# Patient Record
Sex: MALE | Race: WHITE | NOT HISPANIC OR LATINO | Employment: FULL TIME | URBAN - METROPOLITAN AREA
[De-identification: names, ages, dates, MRNs, and addresses within clinical notes are randomized per-mention and may not be internally consistent; named-entity substitution may affect disease eponyms.]

---

## 2017-08-14 ENCOUNTER — HOSPITAL ENCOUNTER (EMERGENCY)
Facility: HOSPITAL | Age: 27
Discharge: HOME | End: 2017-08-14
Payer: OTHER MISCELLANEOUS

## 2017-08-14 VITALS
DIASTOLIC BLOOD PRESSURE: 75 MMHG | HEART RATE: 71 BPM | SYSTOLIC BLOOD PRESSURE: 121 MMHG | RESPIRATION RATE: 18 BRPM | TEMPERATURE: 99.2 F | OXYGEN SATURATION: 96 % | WEIGHT: 190 LBS

## 2017-08-14 DIAGNOSIS — Z20.5 EXPOSURE TO HEPATITIS C: Primary | ICD-10-CM

## 2017-08-14 LAB — ALT SERPL W P-5'-P-CCNC: 47 U/L (ref 12–78)

## 2017-08-14 PROCEDURE — 87340 HEPATITIS B SURFACE AG IA: CPT | Performed by: PHYSICIAN ASSISTANT

## 2017-08-14 PROCEDURE — 99283 EMERGENCY DEPT VISIT LOW MDM: CPT

## 2017-08-14 PROCEDURE — 86706 HEP B SURFACE ANTIBODY: CPT | Performed by: PHYSICIAN ASSISTANT

## 2017-08-14 PROCEDURE — 87389 HIV-1 AG W/HIV-1&-2 AB AG IA: CPT | Performed by: PHYSICIAN ASSISTANT

## 2017-08-14 PROCEDURE — 36415 COLL VENOUS BLD VENIPUNCTURE: CPT | Performed by: PHYSICIAN ASSISTANT

## 2017-08-14 PROCEDURE — 84460 ALANINE AMINO (ALT) (SGPT): CPT | Performed by: PHYSICIAN ASSISTANT

## 2017-08-14 PROCEDURE — 86803 HEPATITIS C AB TEST: CPT | Performed by: PHYSICIAN ASSISTANT

## 2017-08-15 LAB
HBV SURFACE AB SER-ACNC: >1000 MIU/ML
HBV SURFACE AG SER QL: NORMAL
HCV AB SER QL: NORMAL

## 2017-08-16 LAB — HIV 1+2 AB+HIV1 P24 AG SERPL QL IA: NORMAL

## 2017-08-28 ENCOUNTER — APPOINTMENT (OUTPATIENT)
Dept: URGENT CARE | Facility: MEDICAL CENTER | Age: 27
End: 2017-08-28
Payer: OTHER MISCELLANEOUS

## 2017-08-28 PROCEDURE — G0382 LEV 3 HOSP TYPE B ED VISIT: HCPCS

## 2017-08-28 PROCEDURE — 99283 EMERGENCY DEPT VISIT LOW MDM: CPT

## 2017-09-25 ENCOUNTER — TRANSCRIBE ORDERS (OUTPATIENT)
Dept: RADIOLOGY | Facility: MEDICAL CENTER | Age: 27
End: 2017-09-25

## 2017-09-25 ENCOUNTER — APPOINTMENT (OUTPATIENT)
Dept: LAB | Facility: MEDICAL CENTER | Age: 27
End: 2017-09-25
Payer: OTHER MISCELLANEOUS

## 2017-09-25 DIAGNOSIS — Z77.21 PERSONAL HISTORY OF EXPOSURE TO POTENTIALLY HAZARDOUS BODY FLUIDS: Primary | ICD-10-CM

## 2017-09-25 LAB — ALT SERPL W P-5'-P-CCNC: 34 U/L (ref 12–78)

## 2017-09-25 PROCEDURE — 36415 COLL VENOUS BLD VENIPUNCTURE: CPT | Performed by: PHYSICIAN ASSISTANT

## 2017-09-25 PROCEDURE — 84460 ALANINE AMINO (ALT) (SGPT): CPT | Performed by: PHYSICIAN ASSISTANT

## 2017-09-25 PROCEDURE — 87389 HIV-1 AG W/HIV-1&-2 AB AG IA: CPT | Performed by: PHYSICIAN ASSISTANT

## 2017-09-25 PROCEDURE — 87350 HEPATITIS BE AG IA: CPT

## 2017-09-25 PROCEDURE — 86803 HEPATITIS C AB TEST: CPT | Performed by: PHYSICIAN ASSISTANT

## 2017-09-26 LAB
HBV E AG SERPL QL IA: NEGATIVE
HCV AB SER QL: NORMAL

## 2017-09-27 ENCOUNTER — APPOINTMENT (OUTPATIENT)
Dept: LAB | Facility: MEDICAL CENTER | Age: 27
End: 2017-09-27
Payer: OTHER MISCELLANEOUS

## 2017-09-27 ENCOUNTER — APPOINTMENT (OUTPATIENT)
Dept: URGENT CARE | Facility: MEDICAL CENTER | Age: 27
End: 2017-09-27
Payer: OTHER MISCELLANEOUS

## 2017-09-27 ENCOUNTER — TRANSCRIBE ORDERS (OUTPATIENT)
Dept: URGENT CARE | Facility: MEDICAL CENTER | Age: 27
End: 2017-09-27

## 2017-09-27 DIAGNOSIS — Z77.21 HX OF EXPOSURE TO HAZARDOUS BODILY FLUIDS: ICD-10-CM

## 2017-09-27 DIAGNOSIS — Z77.21 HX OF EXPOSURE TO HAZARDOUS BODILY FLUIDS: Primary | ICD-10-CM

## 2017-09-27 LAB — HIV 1+2 AB+HIV1 P24 AG SERPL QL IA: NORMAL

## 2017-09-27 PROCEDURE — 36415 COLL VENOUS BLD VENIPUNCTURE: CPT

## 2017-09-27 PROCEDURE — 87522 HEPATITIS C REVRS TRNSCRPJ: CPT

## 2017-09-29 LAB
HCV RNA SERPL NAA+PROBE-ACNC: NORMAL IU/ML
TEST INFORMATION: NORMAL

## 2017-12-06 ENCOUNTER — OFFICE VISIT (OUTPATIENT)
Dept: URGENT CARE | Facility: MEDICAL CENTER | Age: 27
End: 2017-12-06
Payer: COMMERCIAL

## 2017-12-06 ENCOUNTER — APPOINTMENT (OUTPATIENT)
Dept: RADIOLOGY | Facility: MEDICAL CENTER | Age: 27
End: 2017-12-06
Payer: COMMERCIAL

## 2017-12-06 DIAGNOSIS — S61.512A: ICD-10-CM

## 2017-12-06 PROCEDURE — G0382 LEV 3 HOSP TYPE B ED VISIT: HCPCS

## 2017-12-06 PROCEDURE — S9083 URGENT CARE CENTER GLOBAL: HCPCS

## 2017-12-06 PROCEDURE — 12001 RPR S/N/AX/GEN/TRNK 2.5CM/<: CPT

## 2017-12-06 PROCEDURE — 73110 X-RAY EXAM OF WRIST: CPT

## 2017-12-06 PROCEDURE — 90715 TDAP VACCINE 7 YRS/> IM: CPT

## 2017-12-07 NOTE — PROGRESS NOTES
Assessment    1  Laceration of left wrist (881 02) (J65 171F)    Plan  Laceration of left wrist    · Cephalexin 500 MG Oral Capsule; Take 1 capsule twice daily   · * XR WRIST 3+ VIEW LEFT; Status:Resulted - Requires Verification;   Done: [id number]:21PM  Wrist injury    · Tdap (Boostrix)    Discussion/Summary  Discussion Summary:   Keep wound clean dry and intactfor signs of infection: redness, warmth to touch, fever/chills, nausea, vomitingantibiotics as directed eat yogurt to avoid GI upsetgetting wound wet x 3 dayssutures in 7-10 dayscheck in 24 hours   Medication Side Effects Reviewed: Possible side effects of new medications were reviewed with the patient/guardian today  Understands and agrees with treatment plan: The treatment plan was reviewed with the patient/guardian  The patient/guardian understands and agrees with the treatment plan   Counseling Documentation With Imm: The patient was counseled regarding diagnostic results,-- instructions for management,-- risk factor reductions,-- prognosis,-- patient and family education,-- impressions,-- risks and benefits of treatment options,-- importance of compliance with treatment  Follow Up Instructions: Follow Up with your Primary Care Provider in 1-2 days  If your symptoms worsen, go to the nearest [place] [address] Emergency Department  Chief Complaint    1  Skin Wound  Chief Complaint Free Text Note Form: hit left wrist posterior aspect with remy   strong pulse noted   stated that hand feels numb  History of Present Illness  HPI: This is a 24-year-old male here for laceration to left wrist  patient reports he was cutting firewood for his wood stove and dog bumped his leg  Patient reports some numbness that radiates down his hand  Hospital Based Practices Required Assessment:  Pain Assessment  the patient states they have pain  The pain is located in the with movement   (on a scale of 0 to 10, the patient rates the pain at 5 )  Abuse And Domestic Violence Screen   Yes, the patient is safe at home  -- The patient states no one is hurting them  Depression And Suicide Screen  No, the patient has not had thoughts of hurting themself  No, the patient has not felt depressed in the past 7 days  Primary Language is  English  Readiness To Learn: Receptive  Barriers To Learning: none  Preferred Learning: verbal  Education Completed: disease/condition-- and-- treatment/procedure  Teaching Method: verbal  Person Taught: patient  Evaluation Of Learning: verbalized/demonstrated understanding      Review of Systems  Focused-Male:  Constitutional: no fever or chills, feels well, no tiredness, no recent weight loss or weight gain  ENT: no complaints of earache, no loss of hearing, no nosebleeds or nasal discharge, no sore throat or hoarseness  Cardiovascular: no complaints of slow or fast heart rate, no chest pain, no palpitations, no leg claudication or lower extremity edema  Respiratory: no complaints of shortness of breath, no wheezing or cough, no dyspnea on exertion, no orthopnea or PND  Gastrointestinal: no complaints of abdominal pain, no constipation, no nausea or vomiting, no diarrhea or bloody stools  Genitourinary: no complaints of dysuria or incontinence, no hesitancy, no nocturia, no genital lesion, no inadequacy of penile erection  Musculoskeletal: no complaints of arthralgia, no myalgia, no joint swelling or stiffness, no limb pain or swelling  Integumentary: no complaints of skin rash or lesion, no itching or dry skin, no skin wounds-- and-- as noted in HPI  Neurological: no complaints of headache, no confusion, no numbness or tingling, no dizziness or fainting  Active Problems  1  Infrapatellar or subpatellar bursitis (726 69) (M70 50)   2  Well adult on routine health check (V70 0) (Z00 00)    Past Medical History  1  History of viral warts (V12 09) (Z86 19)  Active Problems And Past Medical History Reviewed:    The active problems and past medical history were reviewed and updated today  Family History  Family History Reviewed: The family history was reviewed and updated today  Social History   · Being A Social Drinker   · Denied: History of Caffeine Use   · Never A Smoker  Social History Reviewed: The social history was reviewed and updated today  Surgical History  Surgical History Reviewed: The surgical history was reviewed and updated today  Current Meds   1  No Reported Medications Recorded  Medication List Reviewed: The medication list was reviewed and updated today  Allergies    1  No Known Drug Allergies    Vitals  Signs   Recorded: [id number] 01:58PM   Temperature: 98 4 F  Heart Rate: 55  Respiration: 18  Systolic: 044  Diastolic: 74  Height: 5 ft 10 in  Weight: 202 lb   BMI Calculated: 28 98  BSA Calculated: 2 1  O2 Saturation: 95    Physical Exam   Constitutional  General appearance: No acute distress, well appearing and well nourished  Skin  Skin and subcutaneous tissue: Normal without rashes or lesions  Examination of the skin for lesions: Abnormal  -- Left wrist: laceration dorsal radial aspect measuring 2cm  Results/Data  * XR WRIST 3+ VIEW LEFT [id number] 02:21PM Corrinne Soman Order Number: [id number]     Test Name Result Flag Reference   XR WRIST 3+ VW LEFT (Report)       LEFT WRIST   INDICATION: Wrist laceration  Evaluation for foreign body  COMPARISON: None   VIEWS: PA (both neutral and ulnar deviation), lateral, and oblique   IMAGES: 4   FINDINGS:   There is no acute fracture or dislocation  No degenerative changes  No lytic or blastic lesions are seen  Soft tissues are unremarkable  No radiopaque foreign body  IMPRESSION:   No acute osseous abnormality or radiopaque foreign body demonstrated      Workstation performed: [id number]   Signed by:  Julio Darby MD  12/6/17       Diagnostic Studies Reviewed: I personally reviewed the films/images/results in the office today  My interpretation follows  X-ray Review Left wrist: No acute osseous abnormality  Procedure   Procedure: wound repair  The wound was located on the dorsal radial pulse,-- and was 2 cm in length  The wound was simple  The wound involved the subcutaneous tissue  The wound was linear-- and-- was contaminated, but-- did not have a foreign body-- and-- did not have tissue loss  the neurovascular exam was normal  there was no tendon injury  The site was prepped with Betadine and Hibiclens  Anesthesia: Local anesthetic was administered  Lidocaine 3 ml, 1%, without epinephrine was injected  Closure: The cutaneous layer was closed with 4 sutures of  simple interrupted sutures were used for the skin closure  Dressing: a sterile dressing was placed-- and-- an antibiotic ointment was applied  Patient Status:  the patient tolerated the procedure well  Message  Return to work or school:   Morgan Stubbs is under my professional care  He was seen in my office on 12/06/2017       Please excuse illness  Homero Kay PA-C        Signatures   Electronically signed by : Bethel Saini, [facility]; Dec  6 2017  2:52PM EST                       (Author)    Electronically signed by : EMMA Goncalves ; Dec  6 2017  3:22PM EST                       (Co-author)

## 2018-01-23 VITALS
HEIGHT: 70 IN | HEART RATE: 55 BPM | TEMPERATURE: 98.4 F | SYSTOLIC BLOOD PRESSURE: 131 MMHG | BODY MASS INDEX: 28.92 KG/M2 | WEIGHT: 202 LBS | RESPIRATION RATE: 18 BRPM | DIASTOLIC BLOOD PRESSURE: 74 MMHG | OXYGEN SATURATION: 95 %

## 2018-06-20 ENCOUNTER — TRANSCRIBE ORDERS (OUTPATIENT)
Dept: ADMINISTRATIVE | Facility: HOSPITAL | Age: 28
End: 2018-06-20

## 2018-06-20 DIAGNOSIS — G47.33 OBSTRUCTIVE SLEEP APNEA (ADULT) (PEDIATRIC): Primary | ICD-10-CM

## 2018-06-29 ENCOUNTER — HOSPITAL ENCOUNTER (OUTPATIENT)
Dept: SLEEP CENTER | Facility: CLINIC | Age: 28
Discharge: HOME | End: 2018-06-29
Payer: COMMERCIAL

## 2018-06-29 DIAGNOSIS — G47.33 OBSTRUCTIVE SLEEP APNEA (ADULT) (PEDIATRIC): ICD-10-CM

## 2018-06-29 PROCEDURE — 95810 POLYSOM 6/> YRS 4/> PARAM: CPT

## 2018-06-29 NOTE — PROGRESS NOTES
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 7AM: Alcohol use:NO    Typical day for patient:YES       Study Documentation  Diagnostic   Snore: Moderate  Supplemental O2: no    O2 flow rate (L/min) range n/a  Minimum SaO2 86%  Baseline SaO2 94%        EKG abnormalities: no     EEG abnormalities: no    Study Terminated:no    Patient classification: employed       Post-Sleep Study    Medication used at bedtime or during sleep study:NO    Patient reports time it took to fall asleep: approx 30 minutes or a bit more    Patient reports waking up during study:3 or more times  Patient reports returning to sleep in 30 minutes or less  Patient reports sleeping 4 hours without dreaming      Patient reports sleep during study:better than usual    Patient rated sleepiness: Somewhat sleepy or tired

## 2018-07-03 ENCOUNTER — TELEPHONE (OUTPATIENT)
Dept: SLEEP CENTER | Facility: CLINIC | Age: 28
End: 2018-07-03

## 2019-12-31 ENCOUNTER — OFFICE VISIT (OUTPATIENT)
Dept: URGENT CARE | Facility: MEDICAL CENTER | Age: 29
End: 2019-12-31
Payer: COMMERCIAL

## 2019-12-31 VITALS
OXYGEN SATURATION: 98 % | WEIGHT: 199 LBS | HEIGHT: 70 IN | SYSTOLIC BLOOD PRESSURE: 112 MMHG | HEART RATE: 68 BPM | DIASTOLIC BLOOD PRESSURE: 70 MMHG | RESPIRATION RATE: 18 BRPM | BODY MASS INDEX: 28.49 KG/M2 | TEMPERATURE: 97.9 F

## 2019-12-31 DIAGNOSIS — R05.9 COUGH: Primary | ICD-10-CM

## 2019-12-31 PROCEDURE — 99213 OFFICE O/P EST LOW 20 MIN: CPT | Performed by: PHYSICIAN ASSISTANT

## 2019-12-31 RX ORDER — BENZONATATE 100 MG/1
100 CAPSULE ORAL 3 TIMES DAILY PRN
Qty: 20 CAPSULE | Refills: 0 | Status: SHIPPED | OUTPATIENT
Start: 2019-12-31 | End: 2021-03-09 | Stop reason: ALTCHOICE

## 2019-12-31 NOTE — PROGRESS NOTES
[facility]        NAME: Elver Erickson is a 30 y o  male  : 1990    MRN: [id number]  DATE: 2019  TIME: 2:29 PM    Assessment and Plan   Cough [R05]  1  Cough  benzonatate (TESSALON PERLES) 100 mg capsule     Likely allergy related, normal exam     Patient Instructions     Use Flonase and Zyrtec daily   may use Tessalon Perles as needed for cough   follow-up with PCP if symptoms do not improve    Chief Complaint     Chief Complaint   Patient presents with    Cough     Cough and post nasal drip x 4 weeks  History of Present Illness         Patient is a 78-year-old male who presents today with complaints of cough and postnasal drip for the past 4 weeks  He has only had mild intermittent congestion  No ear pain, sore throat, fevers, body aches  Has used Mucinex, Zyrtec with little relief of symptoms  No shortness of breath or wheezing  Review of Systems   Review of Systems   Constitutional: Negative for chills and fever  HENT: Positive for congestion and postnasal drip  Negative for ear pain and sore throat  Respiratory: Positive for cough  Negative for shortness of breath and wheezing  Cardiovascular: Negative for chest pain  Musculoskeletal: Negative for myalgias  Current Medications       Current Outpatient Medications:     benzonatate (TESSALON PERLES) 100 mg capsule, Take 1 capsule (100 mg total) by mouth 3 (three) times a day as needed for cough, Disp: 20 capsule, Rfl: 0    Current Allergies     Allergies as of 2019    (No Known Allergies)            The following portions of the patient's history were reviewed and updated as appropriate: allergies, current medications, past family history, past medical history, past social history, past surgical history and problem list      History reviewed  No pertinent past medical history  History reviewed  No pertinent surgical history      Family History   Problem Relation Age of Onset    Ulcerative colitis Mother     Cancer Father          Medications have been verified  Objective   /70   Pulse 68   Temp 97 9 °F (36 6 °C) (Temporal)   Resp 18   Ht 5' 10" (1 778 m)   Wt 90 3 kg (199 lb)   SpO2 98%   BMI 28 55 kg/m²        Physical Exam     Physical Exam   Constitutional: He appears well-developed and well-nourished  HENT:   Head: Normocephalic and atraumatic  Right Ear: Tympanic membrane and ear canal normal    Left Ear: Tympanic membrane and ear canal normal    Nose: Nose normal    Mouth/Throat: Uvula is midline, oropharynx is clear and moist and mucous membranes are normal    Eyes: Pupils are equal, round, and reactive to light  Conjunctivae are normal    Neck: Neck supple  Cardiovascular: Normal rate and regular rhythm  Pulmonary/Chest: Effort normal and breath sounds normal    Lymphadenopathy:     He has no cervical adenopathy  Skin: Skin is warm and dry

## 2021-02-18 DIAGNOSIS — Z23 ENCOUNTER FOR IMMUNIZATION: ICD-10-CM

## 2021-02-19 ENCOUNTER — IMMUNIZATIONS (OUTPATIENT)
Dept: FAMILY MEDICINE CLINIC | Facility: HOSPITAL | Age: 31
End: 2021-02-19

## 2021-02-19 DIAGNOSIS — Z23 ENCOUNTER FOR IMMUNIZATION: Primary | ICD-10-CM

## 2021-02-19 PROCEDURE — 91300 SARS-COV-2 / COVID-19 MRNA VACCINE (PFIZER-BIONTECH) 30 MCG: CPT

## 2021-02-19 PROCEDURE — 0001A SARS-COV-2 / COVID-19 MRNA VACCINE (PFIZER-BIONTECH) 30 MCG: CPT

## 2021-03-09 ENCOUNTER — OFFICE VISIT (OUTPATIENT)
Dept: OBGYN CLINIC | Facility: CLINIC | Age: 31
End: 2021-03-09
Payer: COMMERCIAL

## 2021-03-09 VITALS
BODY MASS INDEX: 26.54 KG/M2 | SYSTOLIC BLOOD PRESSURE: 113 MMHG | DIASTOLIC BLOOD PRESSURE: 68 MMHG | HEART RATE: 63 BPM | WEIGHT: 185 LBS

## 2021-03-09 DIAGNOSIS — M23.91 INTERNAL DERANGEMENT OF RIGHT KNEE: Primary | ICD-10-CM

## 2021-03-09 DIAGNOSIS — M23.92 INTERNAL DERANGEMENT OF LEFT KNEE: ICD-10-CM

## 2021-03-09 DIAGNOSIS — M22.2X1 PATELLOFEMORAL DISORDER OF BOTH KNEES: ICD-10-CM

## 2021-03-09 DIAGNOSIS — M22.2X2 PATELLOFEMORAL DISORDER OF BOTH KNEES: ICD-10-CM

## 2021-03-09 PROCEDURE — 99203 OFFICE O/P NEW LOW 30 MIN: CPT | Performed by: ORTHOPAEDIC SURGERY

## 2021-03-09 NOTE — PROGRESS NOTES
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  1  MRI bilateral knees to evaluate persistent pain after activity modification and physical therapy  2  Activity as tolerated for now  Minimize painful activities  3  Follow up after MRIs are complete  Chief Complaint     Bilateral Knee Pain    History of the Present Illness     Faiza Mendoza is a 26 y o  male with bilateral knee pain for the past 4-5 years  No prior injury  Pain is exacerbated by squatting or increased activity level  Symptoms localize to the anterior aspect with associated swelling on the right  No mechanical symptoms or instability  He tried an extensive course of physical therapy with no improvement  He has tried anti-inflammatory medication with no relief  Denies numbness and tingling, fevers or chills  Physical Exam     /68   Pulse 63   Wt 83 9 kg (185 lb)   BMI 26 54 kg/m²     Bilateral knee:  Effusion:  None  Tenderness:  Patellar tendon  Range of motion:  Extension:  Normal  Flexion:  Normal  Lachman test:  Stable  Valgus stress:  Stable  Varus stress:  Stable  Posterior drawer test:  Stable  Robina's test:  Negative  Patellar grind: Positive  Patellar apprehension test:  Negative    Eyes:  Anicteric sclerae  ENT:  Trachea midline  Lungs:  Normal respiratory effort  Cardiovascular:  Capillary refill is less than 2 seconds  Lymphatic:  No palpable lymphadenopathy  Skin:  Intact without erythema  Neurologic:  Sensation grossly intact to light touch  Psychiatric:  Mood and affect are appropriate  No past medical history on file  No past surgical history on file      No Known Allergies    Current Outpatient Medications on File Prior to Visit   Medication Sig Dispense Refill    [DISCONTINUED] benzonatate (TESSALON PERLES) 100 mg capsule Take 1 capsule (100 mg total) by mouth 3 (three) times a day as needed for cough 20 capsule 0 No current facility-administered medications on file prior to visit  Social History     Tobacco Use    Smoking status: Never Smoker    Smokeless tobacco: Never Used   Substance Use Topics    Alcohol use: Yes     Comment: Social     Drug use: No       Family History   Problem Relation Age of Onset    Ulcerative colitis Mother     Cancer Father        Review of Systems     As stated in the HPI  All other systems were reviewed and are negative      Scribe Attestation    I,:  Keyona Guillermo am acting as a scribe while in the presence of the attending physician :       I,:  Jorge Gordon MD personally performed the services described in this documentation    as scribed in my presence :

## 2021-03-10 ENCOUNTER — IMMUNIZATIONS (OUTPATIENT)
Dept: FAMILY MEDICINE CLINIC | Facility: HOSPITAL | Age: 31
End: 2021-03-10

## 2021-03-10 DIAGNOSIS — Z23 ENCOUNTER FOR IMMUNIZATION: Primary | ICD-10-CM

## 2021-03-10 PROCEDURE — 91300 SARS-COV-2 / COVID-19 MRNA VACCINE (PFIZER-BIONTECH) 30 MCG: CPT

## 2021-03-10 PROCEDURE — 0002A SARS-COV-2 / COVID-19 MRNA VACCINE (PFIZER-BIONTECH) 30 MCG: CPT

## 2021-03-23 ENCOUNTER — HOSPITAL ENCOUNTER (OUTPATIENT)
Dept: MRI IMAGING | Facility: HOSPITAL | Age: 31
Discharge: HOME | End: 2021-03-23
Attending: ORTHOPAEDIC SURGERY
Payer: COMMERCIAL

## 2021-03-23 DIAGNOSIS — M23.91 INTERNAL DERANGEMENT OF RIGHT KNEE: ICD-10-CM

## 2021-03-23 DIAGNOSIS — M23.92 INTERNAL DERANGEMENT OF LEFT KNEE: ICD-10-CM

## 2021-03-23 PROCEDURE — G1004 CDSM NDSC: HCPCS

## 2021-03-23 PROCEDURE — 73721 MRI JNT OF LWR EXTRE W/O DYE: CPT

## 2021-03-29 ENCOUNTER — TELEPHONE (OUTPATIENT)
Dept: GASTROENTEROLOGY | Facility: CLINIC | Age: 31
End: 2021-03-29

## 2021-03-29 ENCOUNTER — DOCUMENTATION (OUTPATIENT)
Dept: GASTROENTEROLOGY | Facility: CLINIC | Age: 31
End: 2021-03-29

## 2021-03-29 NOTE — TELEPHONE ENCOUNTER
Rony Reynoso from [address] [place] [address] called to request most recent colonoscopy report for this patient    Please Fax to [phone]

## 2021-04-06 ENCOUNTER — OFFICE VISIT (OUTPATIENT)
Dept: OBGYN CLINIC | Facility: CLINIC | Age: 31
End: 2021-04-06
Payer: COMMERCIAL

## 2021-04-06 VITALS
HEART RATE: 56 BPM | BODY MASS INDEX: 27.2 KG/M2 | WEIGHT: 190 LBS | DIASTOLIC BLOOD PRESSURE: 74 MMHG | HEIGHT: 70 IN | SYSTOLIC BLOOD PRESSURE: 125 MMHG

## 2021-04-06 DIAGNOSIS — M76.52 PATELLAR TENDONITIS OF BOTH KNEES: Primary | ICD-10-CM

## 2021-04-06 DIAGNOSIS — M76.51 PATELLAR TENDONITIS OF BOTH KNEES: Primary | ICD-10-CM

## 2021-04-06 PROCEDURE — 3008F BODY MASS INDEX DOCD: CPT | Performed by: ORTHOPAEDIC SURGERY

## 2021-04-06 PROCEDURE — 99213 OFFICE O/P EST LOW 20 MIN: CPT | Performed by: ORTHOPAEDIC SURGERY

## 2021-04-06 NOTE — PROGRESS NOTES
Patient Name:  Crissie Habermann  MRN:  [id number]    Assessment & Plan     Bilateral knee Chronic patellar tendonitis, partial tearing right patellar tendon  1  Recommend continue conservative management at this time with activity modification  2  Discussed PRP injections as an alternative  Patient wishes to think this over  Also discussed surgical intervention  Patient wishes to avoid surgery at this time  3  Follow-up as needed    History of the Present Illness      52-year-old male returns to the office today for follow-up regarding his bilateral knees  He recently underwent MRIs and is here to review the results  He still notes pain in the bilateral knees localized to the anterior aspect  Pain is worse increased activity  He denies weakness or instability  Has tried physical therapy and meloxicam without significant relief  General ROS:  Negative for fever or chills  Neurological ROS:  Negative for numbness or tingling  Physical Exam     /74   Pulse 56   Ht 5' 10" (1 778 m)   Wt 86 2 kg (190 lb)   BMI 27 26 kg/m²       Counseling     The patient was counseled regarding diagnostic results, impressions, patient/family education, instructions for management, risks and benefits of treatment options, and prognosis  The total time of the encounter was 20 minutes, and more than 50% of that time was spent in counseling and coordination of care  Data Review     I have personally reviewed pertinent films in PACS, and my interpretation follows  MRI left knee 3/23/21: mild patellar tendinosis  No significant degenerative changes  MRI right knee 3/23/21: Mild degenerative changes medial and patellofemoral compartments  Partial tearing patellar tendon at the inferior pole of the patella with associated tendinosis      Social History     Tobacco Use    Smoking status: Never Smoker    Smokeless tobacco: Never Used   Substance Use Topics    Alcohol use: Yes     Comment: Social     Drug use: No         Scribe Attestation    I,:  Leela Mann PA-C am acting as a scribe while in the presence of the attending physician :       I,:  Mortimer Mathieu, MD personally performed the services described in this documentation    as scribed in my presence :

## 2021-06-09 ENCOUNTER — TELEPHONE (OUTPATIENT)
Dept: GASTROENTEROLOGY | Facility: CLINIC | Age: 31
End: 2021-06-09

## 2021-06-09 NOTE — LETTER
Jacquelyn 10, 2021     Beba Elder    Patient: Juanjo Kimball   YOB: 1990         To whomever this may concern:       Juanjo Kimball is under the care of this office  He required routine colonoscopy screening in 2018 and will require regular colonoscopy screenings in the future  If you have questions, please do not hesitate to call me  Sincerely,        LACIE Corona      CC: No Recipients  LACIE Corona  6/10/2021  9:14 AM  Signed  What is his email address?

## 2021-06-09 NOTE — TELEPHONE ENCOUNTER
Pt is in the [employer] and will need a letter stating that his colonoscopy was routine and it is recommended that he have routine colonoscopies in the future  Please email it to him

## 2021-06-10 ENCOUNTER — TELEPHONE (OUTPATIENT)
Dept: GASTROENTEROLOGY | Facility: CLINIC | Age: 31
End: 2021-06-10

## 2021-06-10 NOTE — TELEPHONE ENCOUNTER
Left message on patient's phone that we will put colonoscopy recall in for December 2023 given family history colon cancer/personal history colon polyps, if he develops any symptoms he can schedule follow up appointment to discuss and then we would consider colonoscopy sooner  ----- Message from Dandy Limon MD sent at 6/10/2021 12:30 PM EDT -----  Regarding: RE: Colonoscopy Recall    He could wait for two years more if he is completely asymptomatic   ----- Message -----  From: LACIE Corona  Sent: 6/10/2021  10:16 AM EDT  To: Dandy Limon MD  Subject: Colonoscopy Recall                               This is a 33year old male with family history of colon cancer in his father and grandfather diagnosed at age 48  We last saw him in 2018 for colonoscopy due to rectal bleeding which showed 2 hyperplastic polyps and internal hemorrhoids  He said you told him to return in 3-5 years, but there is no recall listed in [address] [place] [address] [place]  If the recall is 3 years he will be due this coming December  Please let me know and I can make sure a recall is placed for colonoscopy in December this year   Thank you

## 2021-06-10 NOTE — TELEPHONE ENCOUNTER
Thank you, note was sent  There is no recall listed for his next colonoscopy in [facility], so I reached out to Dr Ramirez Parks to see when next colonoscopy is due since he has a family history of colon cancer in his father/grandfather

## 2021-10-26 ENCOUNTER — OFFICE VISIT (OUTPATIENT)
Dept: GASTROENTEROLOGY | Facility: CLINIC | Age: 31
End: 2021-10-26
Payer: COMMERCIAL

## 2021-10-26 VITALS
SYSTOLIC BLOOD PRESSURE: 127 MMHG | DIASTOLIC BLOOD PRESSURE: 79 MMHG | HEIGHT: 70 IN | BODY MASS INDEX: 26.92 KG/M2 | WEIGHT: 188 LBS | HEART RATE: 55 BPM

## 2021-10-26 DIAGNOSIS — E80.4 GILBERT'S SYNDROME: ICD-10-CM

## 2021-10-26 DIAGNOSIS — Z80.0 FAMILY HISTORY OF COLON CANCER: ICD-10-CM

## 2021-10-26 DIAGNOSIS — K63.5 HYPERPLASTIC COLONIC POLYP, UNSPECIFIED PART OF COLON: ICD-10-CM

## 2021-10-26 DIAGNOSIS — R79.89 ABNORMAL LFTS: Primary | ICD-10-CM

## 2021-10-26 PROCEDURE — 99244 OFF/OP CNSLTJ NEW/EST MOD 40: CPT | Performed by: INTERNAL MEDICINE

## 2022-01-11 ENCOUNTER — TELEPHONE (OUTPATIENT)
Dept: OBGYN CLINIC | Facility: HOSPITAL | Age: 32
End: 2022-01-11

## 2022-02-22 ENCOUNTER — APPOINTMENT (OUTPATIENT)
Dept: RADIOLOGY | Facility: AMBULARY SURGERY CENTER | Age: 32
End: 2022-02-22
Payer: COMMERCIAL

## 2022-02-22 ENCOUNTER — OFFICE VISIT (OUTPATIENT)
Dept: OBGYN CLINIC | Facility: CLINIC | Age: 32
End: 2022-02-22
Payer: COMMERCIAL

## 2022-02-22 VITALS
BODY MASS INDEX: 27.29 KG/M2 | DIASTOLIC BLOOD PRESSURE: 76 MMHG | HEIGHT: 70 IN | WEIGHT: 190.6 LBS | SYSTOLIC BLOOD PRESSURE: 120 MMHG | HEART RATE: 63 BPM

## 2022-02-22 DIAGNOSIS — G89.29 CHRONIC RIGHT SHOULDER PAIN: Primary | ICD-10-CM

## 2022-02-22 DIAGNOSIS — M25.511 CHRONIC RIGHT SHOULDER PAIN: ICD-10-CM

## 2022-02-22 DIAGNOSIS — G89.29 CHRONIC RIGHT SHOULDER PAIN: ICD-10-CM

## 2022-02-22 DIAGNOSIS — M25.511 CHRONIC RIGHT SHOULDER PAIN: Primary | ICD-10-CM

## 2022-02-22 PROCEDURE — 99214 OFFICE O/P EST MOD 30 MIN: CPT | Performed by: PHYSICAL MEDICINE & REHABILITATION

## 2022-02-22 PROCEDURE — 73030 X-RAY EXAM OF SHOULDER: CPT

## 2022-02-22 RX ORDER — AMOXICILLIN AND CLAVULANATE POTASSIUM 875; 125 MG/1; MG/1
TABLET, FILM COATED ORAL
COMMUNITY
Start: 2021-12-05 | End: 2022-04-18

## 2022-02-22 RX ORDER — IBUPROFEN 200 MG
200 TABLET ORAL EVERY 6 HOURS PRN
COMMUNITY
End: 2022-04-18

## 2022-02-22 NOTE — PROGRESS NOTES
1  Chronic right shoulder pain  XR shoulder 2+ vw right     Orders Placed This Encounter   Procedures    XR shoulder 2+ vw right        Impression:  Right shoulder pain likely secondary to biceps tendinitis and subacromial impingement  We discussed different treatment options including a subacromial space steroid injection, physical therapy, anti-inflammatories and home exercise program   Patient's active range of motion is limited from this injury  He is able to abduct and flex his shoulder to about 70 ° at which point it becomes painful  He is able to get to nearly full range but with much pain  The patient has had this pain 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  It has been worsening over the past few months/years  If he is unable to obtain care from the [place], I would like to see him back  Imaging Studies (I personally reviewed images in PACS and report):  Right shoulder x-rays most recent to this encounter reviewed  These images show no acute osseous abnormalities or severe degeneration  No follow-ups on file  Patient is in agreement with the above plan  HPI:  Stas Johnston is a 33 y o  male  who presents for evaluation of   Chief Complaint   Patient presents with    Right Shoulder - Pain       Onset/Mechanism:  Pain started about 12 years ago 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  Location:  Anterior shoulder  Radiation:  Into the posterior shoulder  Provocative:  Reaching and lifting activities  Severity:  Worsening  Associated Symptoms:  Trouble sleeping on his right side and with certain activities of daily living  Treatment so far:  No recent treatment  Following history reviewed and updated:  History reviewed  No pertinent past medical history  History reviewed  No pertinent surgical history    Social History   Social History     Substance and Sexual Activity   Alcohol Use Yes    Comment: Social      Social History     Substance and Sexual Activity Drug Use No     Social History     Tobacco Use   Smoking Status Never Smoker   Smokeless Tobacco Never Used     Family History   Problem Relation Age of Onset    Ulcerative colitis Mother     Cancer Father      No Known Allergies     Constitutional:  /76   Pulse 63   Ht 5' 10" (1 778 m)   Wt 86 5 kg (190 lb 9 6 oz)   BMI 27 35 kg/m²    General: NAD  Eyes: Anicteric sclerae  Neck: Supple  Lungs: Unlabored breathing  Cardiovascular: No lower extremity edema  Skin: Intact without erythema  Neurologic: Sensation intact to light touch  Psychiatric: Mood and affect are appropriate  Right Shoulder Exam     Tenderness   The patient is experiencing tenderness in the acromion and biceps tendon      Range of Motion   Active abduction: abnormal   Passive abduction: normal   External rotation: abnormal   Forward flexion: abnormal   Internal rotation 0 degrees:  T12 abnormal     Tests   Apprehension: positive  Dan test: positive  Cross arm: negative  Impingement: positive  Drop arm: negative  Sulcus: absent    Other   Erythema: absent  Scars: absent  Sensation: normal  Pulse: present    Comments:  Positive speed's test              Procedures

## 2022-04-18 ENCOUNTER — OFFICE VISIT (OUTPATIENT)
Dept: FAMILY MEDICINE CLINIC | Facility: CLINIC | Age: 32
End: 2022-04-18
Payer: COMMERCIAL

## 2022-04-18 VITALS
DIASTOLIC BLOOD PRESSURE: 64 MMHG | WEIGHT: 190 LBS | HEIGHT: 70 IN | SYSTOLIC BLOOD PRESSURE: 106 MMHG | HEART RATE: 62 BPM | BODY MASS INDEX: 27.2 KG/M2 | TEMPERATURE: 97.9 F | OXYGEN SATURATION: 98 % | RESPIRATION RATE: 16 BRPM

## 2022-04-18 DIAGNOSIS — M22.2X1 PATELLOFEMORAL DISORDER OF BOTH KNEES: ICD-10-CM

## 2022-04-18 DIAGNOSIS — H93.13 BILATERAL TINNITUS: ICD-10-CM

## 2022-04-18 DIAGNOSIS — Z00.00 ANNUAL PHYSICAL EXAM: Primary | ICD-10-CM

## 2022-04-18 DIAGNOSIS — M22.2X2 PATELLOFEMORAL DISORDER OF BOTH KNEES: ICD-10-CM

## 2022-04-18 PROCEDURE — 3725F SCREEN DEPRESSION PERFORMED: CPT | Performed by: FAMILY MEDICINE

## 2022-04-18 PROCEDURE — 99385 PREV VISIT NEW AGE 18-39: CPT | Performed by: FAMILY MEDICINE

## 2022-04-18 PROCEDURE — 3008F BODY MASS INDEX DOCD: CPT | Performed by: FAMILY MEDICINE

## 2022-04-18 PROCEDURE — 99173 VISUAL ACUITY SCREEN: CPT | Performed by: FAMILY MEDICINE

## 2022-04-18 PROCEDURE — 1036F TOBACCO NON-USER: CPT | Performed by: FAMILY MEDICINE

## 2022-04-18 NOTE — PROGRESS NOTES
[facility]    NAME: Quang Kearney  AGE: 33 y o  SEX: male  : 1990     DATE: 2022     Assessment and Plan:     Problem List Items Addressed This Visit        Musculoskeletal and Integument    Patellofemoral disorder of both knees     Worsening bilateral knee pain with noted osteoarthritis on MRI  Patient interested in steroid injection, will obtain x-ray for evaluation         Relevant Orders    XR knee 3 vw right non injury    XR knee 3 vw left non injury       Other    Annual physical exam - Primary      Other Visit Diagnoses     Bilateral tinnitus        Relevant Orders    Ambulatory Referral to Audiology          Immunizations and preventive care screenings were discussed with patient today  Appropriate education was printed on patient's after visit summary  Counseling:  Alcohol/drug use: discussed moderation in alcohol intake, the recommendations for healthy alcohol use, and avoidance of illicit drug use  Dental Health: discussed importance of regular tooth brushing, flossing, and dental visits  Injury prevention: discussed safety/seat belts, safety helmets, smoke detectors, carbon dioxide detectors, and smoking near bedding or upholstery  Sexual health: discussed sexually transmitted diseases, partner selection, use of condoms, avoidance of unintended pregnancy, and contraceptive alternatives  · Exercise: the importance of regular exercise/physical activity was discussed  Recommend exercise 3-5 times per week for at least 30 minutes  BMI Counseling: Body mass index is 27 46 kg/m²  The BMI is above normal  Nutrition recommendations include reducing portion sizes, 3-5 servings of fruits/vegetables daily and consuming healthier snacks  Exercise recommendations include moderate aerobic physical activity for 150 minutes/week  No follow-ups on file  Chief Complaint:     No chief complaint on file  History of Present Illness:     Adult Annual Physical   Patient here for a comprehensive physical exam  The patient reports problems - 75-year-old male patient here for annual physical exam   Patient's additional concern includes ringing in his ears  This has been a chronic issue since 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 as a   He reports persistent ringing on bilateral ears  Patient has tried to manage this with listening to music in a quiet room  Patient reports there is some concern about worsening tinnitus specifically the noises a louder  Pt also reports history of bilateral knee pain that has been present since his deployment in [id number]  Diet and Physical Activity  · Diet/Nutrition: well balanced diet and consuming 3-5 servings of fruits/vegetables daily  · Exercise: 3-4 times a week on average and 30-60 minutes on average  Depression Screening  PHQ-2/9 Depression Screening    Little interest or pleasure in doing things: 0 - not at all  Feeling down, depressed, or hopeless: 0 - not at all  PHQ-2 Score: 0  PHQ-2 Interpretation: Negative depression screen       General Health  · Sleep: sleep apnea with insomina  · Hearing: decreased - bilateral and tinnitus  · Vision: no vision problems  · Dental: regular dental visits and brushes teeth twice daily  [facility]  · History of STDs?: no      Review of Systems:     Review of Systems   Constitutional: Negative for appetite change, chills, fever and unexpected weight change  HENT: Positive for tinnitus  Negative for congestion, rhinorrhea, sore throat, trouble swallowing and voice change  Eyes: Negative for visual disturbance  Respiratory: Positive for apnea  Negative for cough, chest tightness and shortness of breath  Cardiovascular: Negative for chest pain and palpitations     Gastrointestinal: Negative for abdominal pain, blood in stool, constipation, diarrhea, nausea and vomiting  Genitourinary: Negative for dysuria and hematuria  Musculoskeletal: Positive for arthralgias  Right shoulder injury, bilateral knee tendinitis   Neurological: Positive for headaches (baseline migraine, 2 times a week)  Negative for dizziness and light-headedness  Psychiatric/Behavioral: Positive for sleep disturbance  Past Medical History:     No past medical history on file  Past Surgical History:     Past Surgical History:   Procedure Laterality Date    HERNIA REPAIR      VASECTOMY        Social History:     Social History     Socioeconomic History    Marital status: /Civil Union     Spouse name: None    Number of children: None    Years of education: None    Highest education level: None   Occupational History    None   Tobacco Use    Smoking status: Never Smoker    Smokeless tobacco: Never Used   Vaping Use    Vaping Use: Never used   Substance and Sexual Activity    Alcohol use: Yes     Comment: Social     Drug use: No    Sexual activity: None   Other Topics Concern    None   Social History Narrative    None     Social Determinants of Health     Financial Resource Strain: Not on file   Food Insecurity: Not on file   Transportation Needs: Not on file   Physical Activity: Not on file   Stress: Not on file   Social Connections: Not on file   Intimate Partner Violence: Not on file   Housing Stability: Not on file      Family History:     Family History   Problem Relation Age of Onset    Ulcerative colitis Mother     Cancer Father         colon      Current Medications:     No current outpatient medications on file  No current facility-administered medications for this visit        Allergies:     No Known Allergies   Physical Exam:     /64 (BP Location: Right arm, Patient Position: Sitting, Cuff Size: Large)   Pulse 62   Temp 97 9 °F (36 6 °C) (Temporal)   Resp 16   Ht 5' 9 75" (1 772 m)   Wt 86 2 kg (190 lb)   SpO2 98%   BMI 27 46 kg/m²     Physical Exam  Vitals reviewed  Constitutional:       General: He is not in acute distress  Appearance: Normal appearance  He is not ill-appearing, toxic-appearing or diaphoretic  HENT:      Head: Normocephalic  Right Ear: Tympanic membrane, ear canal and external ear normal  There is no impacted cerumen  Left Ear: Tympanic membrane, ear canal and external ear normal  There is no impacted cerumen  Cardiovascular:      Rate and Rhythm: Normal rate and regular rhythm  Pulses: Normal pulses  Pulmonary:      Effort: Pulmonary effort is normal  No respiratory distress  Breath sounds: Normal breath sounds  Abdominal:      General: Abdomen is flat  Bowel sounds are normal  There is no distension  Palpations: Abdomen is soft  Musculoskeletal:         General: Tenderness present  No swelling, deformity or signs of injury  Normal range of motion  Comments: Tenderness on the inferior patellar tendon on bilateral lower extremity  Mild crepitus appreciated on flexion extension of bilateral knee   Skin:     General: Skin is warm and dry  Capillary Refill: Capillary refill takes less than 2 seconds  Coloration: Skin is not jaundiced  Neurological:      General: No focal deficit present  Mental Status: He is alert and oriented to person, place, and time     Psychiatric:         Mood and Affect: Mood normal           Jacquelin Murphy MD   [facility]

## 2022-04-18 NOTE — PATIENT INSTRUCTIONS

## 2022-04-18 NOTE — ASSESSMENT & PLAN NOTE
Worsening bilateral knee pain with noted osteoarthritis on MRI  Patient interested in steroid injection, will obtain x-ray for evaluation

## 2022-04-23 ENCOUNTER — APPOINTMENT (OUTPATIENT)
Dept: RADIOLOGY | Facility: MEDICAL CENTER | Age: 32
End: 2022-04-23
Payer: COMMERCIAL

## 2022-04-23 DIAGNOSIS — M22.2X2 PATELLOFEMORAL DISORDER OF BOTH KNEES: ICD-10-CM

## 2022-04-23 DIAGNOSIS — M22.2X1 PATELLOFEMORAL DISORDER OF BOTH KNEES: ICD-10-CM

## 2022-04-23 PROCEDURE — 73562 X-RAY EXAM OF KNEE 3: CPT

## 2022-05-18 ENCOUNTER — OFFICE VISIT (OUTPATIENT)
Dept: FAMILY MEDICINE CLINIC | Facility: CLINIC | Age: 32
End: 2022-05-18
Payer: COMMERCIAL

## 2022-05-18 VITALS
HEART RATE: 58 BPM | OXYGEN SATURATION: 98 % | BODY MASS INDEX: 27.49 KG/M2 | TEMPERATURE: 98 F | HEIGHT: 70 IN | DIASTOLIC BLOOD PRESSURE: 72 MMHG | SYSTOLIC BLOOD PRESSURE: 122 MMHG | WEIGHT: 192 LBS

## 2022-05-18 DIAGNOSIS — M76.52 PATELLAR TENDINITIS OF BOTH KNEES: Primary | ICD-10-CM

## 2022-05-18 DIAGNOSIS — M76.51 PATELLAR TENDINITIS OF BOTH KNEES: Primary | ICD-10-CM

## 2022-05-18 PROCEDURE — 99214 OFFICE O/P EST MOD 30 MIN: CPT | Performed by: FAMILY MEDICINE

## 2022-05-18 PROCEDURE — 1036F TOBACCO NON-USER: CPT | Performed by: FAMILY MEDICINE

## 2022-05-18 PROCEDURE — 3008F BODY MASS INDEX DOCD: CPT | Performed by: FAMILY MEDICINE

## 2022-05-18 RX ORDER — PREDNISONE 20 MG/1
40 TABLET ORAL DAILY
Qty: 10 TABLET | Refills: 0 | Status: SHIPPED | OUTPATIENT
Start: 2022-05-18 | End: 2022-05-23

## 2022-05-18 NOTE — PROGRESS NOTES
Assessment/Plan:    Patellar tendinitis of both knees  Bilateral patella tendon eyes noted  Please applied knee sleeve for compression while at work  Elke Bain may take these sleeps off when sleeping  Will start patient on prednisone 40 mg for 5 days for anti-inflammatory purpose  Afterwards, patient may take Tylenol 500 mg 2 tablets a day every 8 hours for the next 2 weeks  Do not take more than 3000 mg of Tylenol in 1 day as this can damage to liver  Apply ice 15 minutes at a time over clots to the affected area especially after work  You may repeat this up to 4 times a day  Will provide referral to physical therapy for treatment for patellar tendinitis    Avoid heavy lifting squatting or needing for prolonged period time for the next 4-6 weeks      Subjective:      Patient ID: Deidre Krueger is a 27 y o  male  HPI    66-year-old male patient presents for follow-up regarding his chronic bilateral knee pain  Review x-ray results together with patient, there is evidence of bilateral patella tendinitis  No significant degenerative changes noted  Patient has not tried medication in regards to treatment for bilateral knee pain  Reports pain about 3 to 4/10  Squatting makes the pain significantly worse  Review of Systems   Constitutional: Negative for chills and fever  HENT: Negative for congestion, rhinorrhea and sore throat  Respiratory: Negative for shortness of breath  Cardiovascular: Negative for chest pain  Gastrointestinal: Negative for abdominal pain  Musculoskeletal: Positive for arthralgias  Neurological: Negative for headaches  Objective:    /72 (BP Location: Left arm, Patient Position: Sitting, Cuff Size: Standard)   Pulse 58   Temp 98 °F (36 7 °C) (Temporal)   Ht 5' 9 75" (1 772 m)   Wt 87 1 kg (192 lb)   SpO2 98%   BMI 27 75 kg/m²     Physical Exam  Vitals reviewed  Constitutional:       Appearance: Normal appearance  HENT:      Head: Normocephalic     Cardiovascular: Rate and Rhythm: Normal rate and regular rhythm  Pulses: Normal pulses  Heart sounds: Normal heart sounds  Pulmonary:      Effort: Pulmonary effort is normal       Breath sounds: Normal breath sounds  Abdominal:      General: Abdomen is flat  Musculoskeletal:         General: No swelling, tenderness, deformity or signs of injury  Normal range of motion  Skin:     General: Skin is warm and dry  Capillary Refill: Capillary refill takes less than 2 seconds  Coloration: Skin is not jaundiced  Neurological:      General: No focal deficit present  Mental Status: He is alert and oriented to person, place, and time  Psychiatric:         Mood and Affect: Mood normal             FERNANDO Rogers  Family Medicine    Please excuse any "sound-alike" errors that may have ocurred during the process of dictation  Parts of this note have been dictated and there may be errors present in the transcription process  Thank you

## 2022-05-18 NOTE — ASSESSMENT & PLAN NOTE
Bilateral patella tendon eyes noted  Please applied knee sleeve for compression while at work  Kain Krause may take these sleeps off when sleeping  Will start patient on prednisone 40 mg for 5 days for anti-inflammatory purpose  Afterwards, patient may take Tylenol 500 mg 2 tablets a day every 8 hours for the next 2 weeks  Do not take more than 3000 mg of Tylenol in 1 day as this can damage to liver  Apply ice 15 minutes at a time over clots to the affected area especially after work  You may repeat this up to 4 times a day  Will provide referral to physical therapy for treatment for patellar tendinitis    Avoid heavy lifting squatting or needing for prolonged period time for the next 4-6 weeks

## 2022-08-29 ENCOUNTER — APPOINTMENT (OUTPATIENT)
Dept: RADIOLOGY | Facility: MEDICAL CENTER | Age: 32
End: 2022-08-29
Payer: COMMERCIAL

## 2022-08-29 ENCOUNTER — OFFICE VISIT (OUTPATIENT)
Dept: FAMILY MEDICINE CLINIC | Facility: CLINIC | Age: 32
End: 2022-08-29
Payer: COMMERCIAL

## 2022-08-29 VITALS
HEIGHT: 70 IN | TEMPERATURE: 99 F | SYSTOLIC BLOOD PRESSURE: 112 MMHG | BODY MASS INDEX: 26.34 KG/M2 | WEIGHT: 184 LBS | HEART RATE: 59 BPM | OXYGEN SATURATION: 98 % | DIASTOLIC BLOOD PRESSURE: 72 MMHG | RESPIRATION RATE: 16 BRPM

## 2022-08-29 DIAGNOSIS — M54.50 LUMBAR BACK PAIN: ICD-10-CM

## 2022-08-29 DIAGNOSIS — M54.50 LUMBAR BACK PAIN: Primary | ICD-10-CM

## 2022-08-29 PROCEDURE — 3725F SCREEN DEPRESSION PERFORMED: CPT | Performed by: FAMILY MEDICINE

## 2022-08-29 PROCEDURE — 72110 X-RAY EXAM L-2 SPINE 4/>VWS: CPT

## 2022-08-29 PROCEDURE — 99214 OFFICE O/P EST MOD 30 MIN: CPT | Performed by: FAMILY MEDICINE

## 2022-08-29 RX ORDER — NAPROXEN 500 MG/1
500 TABLET ORAL 2 TIMES DAILY WITH MEALS
Qty: 28 TABLET | Refills: 0 | Status: SHIPPED | OUTPATIENT
Start: 2022-08-29 | End: 2022-09-12

## 2022-08-29 RX ORDER — CYCLOBENZAPRINE HCL 5 MG
2.5 TABLET ORAL
Qty: 30 TABLET | Refills: 0 | Status: SHIPPED | OUTPATIENT
Start: 2022-08-29

## 2022-08-29 NOTE — PROGRESS NOTES
Assessment/Plan:    Lumbar back pain  Lumbar back pain after landing from 2 5-3 foot with body weight  Patient did have some tenderness over the spinous process  Concern for occult fracture  Obtain x-ray for evaluation  In the meantime will treat with muscle relaxant and NSAID        Subjective:      Patient ID: Madalyn Doty is a 27 y o  male  HPI    64-year-old male patient presents for evaluation for acute lower back pain  Patient was training in [place] as [employer], jump down from height about 2 5-3 foot  Patient experienced moderate to severe back pain after landing  6 to 7/10  Patient believes he did not cushioning his fall which is cause his symptoms  Patient does have a history of back pain but denies any specific surgeries or injuries  Patient reports pain has overall been unchanged, about 4 to 5/10 at this time  He does have decrease in his range of motion  Denies any significant numbness or tingling but does have occasional shooting pain down his right side  Has been using ibuprofen which seemed to help with the pain  Initial injury occurred on 08/25/2022  Review of Systems   Constitutional: Negative for chills and fever  HENT: Negative for congestion, rhinorrhea and sore throat  Respiratory: Negative for shortness of breath  Cardiovascular: Negative for chest pain  Gastrointestinal: Negative for abdominal pain  Musculoskeletal: Positive for back pain  Negative for arthralgias  Neurological: Negative for weakness, numbness and headaches  Objective:    /72 (BP Location: Left arm, Patient Position: Sitting, Cuff Size: Standard)   Pulse 59   Temp 99 °F (37 2 °C) (Temporal)   Resp 16   Ht 5' 9 75" (1 772 m)   Wt 83 5 kg (184 lb)   SpO2 98%   BMI 26 59 kg/m²       Physical Exam  Vitals reviewed  Constitutional:       General: He is not in acute distress  Appearance: Normal appearance  He is normal weight   He is not ill-appearing, toxic-appearing or diaphoretic  Cardiovascular:      Rate and Rhythm: Normal rate and regular rhythm  Pulses: Normal pulses  Heart sounds: Normal heart sounds  No murmur heard  Pulmonary:      Effort: Pulmonary effort is normal  No respiratory distress  Breath sounds: Normal breath sounds  Abdominal:      General: Abdomen is flat  Bowel sounds are normal  There is no distension  Palpations: Abdomen is soft  Musculoskeletal:         General: Tenderness present  No swelling, deformity or signs of injury  Normal range of motion  Comments: Tenderness over the L1/L2 spinous process as well as some muscle tension over the paralumbar spinal muscle on the right side  Limited range of motion with rotation of the spine toward the left   Skin:     General: Skin is warm and dry  Capillary Refill: Capillary refill takes less than 2 seconds  Coloration: Skin is not jaundiced  Neurological:      General: No focal deficit present  Mental Status: He is alert  Psychiatric:         Mood and Affect: Mood normal           FERNANDO Yoder  Family Medicine    Please excuse any "sound-alike" errors that may have ocurred during the process of dictation  Parts of this note have been dictated and there may be errors present in the transcription process  Thank you

## 2022-08-29 NOTE — ASSESSMENT & PLAN NOTE
Lumbar back pain after landing from 2 5-3 foot with body weight  Patient did have some tenderness over the spinous process  Concern for occult fracture  Obtain x-ray for evaluation  In the meantime will treat with muscle relaxant and NSAID

## 2022-10-06 ENCOUNTER — RA CDI HCC (OUTPATIENT)
Dept: OTHER | Facility: HOSPITAL | Age: 32
End: 2022-10-06

## 2022-10-06 NOTE — PROGRESS NOTES
[facility]       Chart reviewed, no opportunity found: CHART REVIEWED, NO OPPORTUNITY FOUND        Patients Insurance        Commercial Insurance: [address] [place] [address]

## 2022-10-10 ENCOUNTER — OFFICE VISIT (OUTPATIENT)
Dept: FAMILY MEDICINE CLINIC | Facility: CLINIC | Age: 32
End: 2022-10-10
Payer: COMMERCIAL

## 2022-10-10 VITALS
OXYGEN SATURATION: 99 % | SYSTOLIC BLOOD PRESSURE: 132 MMHG | DIASTOLIC BLOOD PRESSURE: 78 MMHG | HEART RATE: 74 BPM | HEIGHT: 70 IN | WEIGHT: 190.8 LBS | BODY MASS INDEX: 27.32 KG/M2 | TEMPERATURE: 97.2 F

## 2022-10-10 DIAGNOSIS — M54.50 LUMBAR BACK PAIN: ICD-10-CM

## 2022-10-10 DIAGNOSIS — F41.9 ANXIETY: ICD-10-CM

## 2022-10-10 DIAGNOSIS — G47.30 SLEEP APNEA, UNSPECIFIED TYPE: Primary | ICD-10-CM

## 2022-10-10 PROCEDURE — 99214 OFFICE O/P EST MOD 30 MIN: CPT | Performed by: FAMILY MEDICINE

## 2022-10-10 NOTE — ASSESSMENT & PLAN NOTE
Patient reports baseline sleep apnea after returning from [place]  Patient denies any recurrent nightmares and has went through testing for PTSD  At this time not interested in medications such as trazodone for sleep and anxiety  Will continue to monitor symptoms  Please use CPAP on nightly basis

## 2022-10-10 NOTE — ASSESSMENT & PLAN NOTE
Patient with anxiety specifically relating to quality of sleep as well as his performance the next day  EDUARDO-7 shows moderate anxiety symptoms  If this worsens, will consider starting patient on low-dose SSRI for sleep and management of anxiety

## 2022-10-10 NOTE — LETTER
October 10, 2022     Patient: Jose Logan  YOB: 1990  Date of Visit: 10/10/2022      To Whom it May Concern:    Jose Logan is under my professional care  Carmen Verma was seen in my office on 10/10/2022  Carmen Verma experiencing moderate anxiety symptoms likely stemming from in part his sleep apnea  Patient has difficulties falling asleep and staying asleep and feels anxious about his performance a day after due to his poor sleep  I believe improved control/management of his sleep apnea may help with his anxiety symptoms  If you have any questions or concerns, please don't hesitate to call           Sincerely,          Marga Kaur MD        CC: No Recipients

## 2022-10-10 NOTE — PROGRESS NOTES
Name: Maren Gonzalez      : 1990      MRN: [id number]  Encounter Provider: Crystal Dave MD  Encounter Date: 10/10/2022   Encounter department: [address] [place] [address]  Sleep apnea, unspecified type  Assessment & Plan:  Patient reports baseline sleep apnea after returning from [place]  Patient denies any recurrent nightmares and has went through testing for PTSD  At this time not interested in medications such as trazodone for sleep and anxiety  Will continue to monitor symptoms  Please use CPAP on nightly basis      2  Anxiety  Assessment & Plan:  Patient with anxiety specifically relating to quality of sleep as well as his performance the next day  EDUARDO-7 shows moderate anxiety symptoms  If this worsens, will consider starting patient on low-dose SSRI for sleep and management of anxiety      3  Lumbar back pain  Assessment & Plan:  Improving  Patient continues to have occasional pain  Lumbar spine x-ray does show mild disc space narrowing between L4 and L5   This is likely secondary to his [employer] service taking contacts patient's occupation age  He may use over-the-counter anti-inflammatory medication as needed for symptomatic relief  No more than 3000 mg of Tylenol in 1 day  No more than 2 tablets of ibuprofen with meals up to 3 times a day  Stretching exercise may help with symptoms           Subjective      HPI     57-year-old male patient here for follow-up regarding his lower back pain  X-ray completed on 2022 showed mild intervertebral disc space narrowing at L4-L5  Patient reports overall improvement his symptoms but does have occasional shooting pain up and down his lumbar spine  Patient has not required additional Flexeril  Patient still have some naproxen left over  Patient denies any significant numbness and tingling  Patient reports some worsening anxiety symptoms regarding his sleep apnea   Patient does use his CPAP at night but does have some apprehension with going to sleep  Patient feels anxiety causing poor sleep every night of the week  Patient does have difficulty both falling asleep and staying asleep  He does not feel well rested in the morning  He has tried melatonin for sleep  EDUARDO-7 Flowsheet Screening    Flowsheet Row Most Recent Value   Over the last 2 weeks, how often have you been bothered by any of the following problems? Feeling nervous, anxious, or on edge 2   Not being able to stop or control worrying 2   Worrying too much about different things 2   Trouble relaxing 2   Being so restless that it is hard to sit still 2   Becoming easily annoyed or irritable 2   Feeling afraid as if something awful might happen 1   EDUARDO-7 Total Score 13          Review of Systems   Constitutional: Negative for chills and fever  HENT: Negative for congestion, rhinorrhea and sore throat  Respiratory: Negative for shortness of breath  Cardiovascular: Negative for chest pain  Gastrointestinal: Negative for abdominal pain  Musculoskeletal: Positive for arthralgias (shoulder pain) and back pain (improving)  Neurological: Negative for dizziness, light-headedness and headaches  Psychiatric/Behavioral: Positive for sleep disturbance (uses CPAP for sleep)  The patient is nervous/anxious  Current Outpatient Medications on File Prior to Visit   Medication Sig   • cyclobenzaprine (FLEXERIL) 5 mg tablet Take 0 5 tablets (2 5 mg total) by mouth daily at bedtime (Patient not taking: Reported on 10/10/2022)   • naproxen (Naprosyn) 500 mg tablet Take 1 tablet (500 mg total) by mouth 2 (two) times a day with meals for 14 days       Objective     /78 (BP Location: Right arm, Patient Position: Sitting, Cuff Size: Standard)   Pulse 74   Temp (!) 97 2 °F (36 2 °C) (Temporal)   Ht 5' 9 75" (1 772 m)   Wt 86 5 kg (190 lb 12 8 oz)   SpO2 99%   BMI 27 57 kg/m²     Physical Exam  Vitals reviewed     Constitutional:       General: He is not in acute distress  Appearance: Normal appearance  He is not ill-appearing, toxic-appearing or diaphoretic  Cardiovascular:      Rate and Rhythm: Normal rate and regular rhythm  Pulses: Normal pulses  Heart sounds: Normal heart sounds  No murmur heard  Pulmonary:      Effort: Pulmonary effort is normal  No respiratory distress  Breath sounds: Normal breath sounds  Abdominal:      General: Abdomen is flat  Bowel sounds are normal  There is no distension  Palpations: Abdomen is soft  Musculoskeletal:         General: No swelling, tenderness, deformity or signs of injury  Normal range of motion  Skin:     General: Skin is warm and dry  Capillary Refill: Capillary refill takes less than 2 seconds  Coloration: Skin is not jaundiced  Neurological:      General: No focal deficit present  Mental Status: He is alert     Psychiatric:         Mood and Affect: Mood normal               Bre Day MD

## 2022-10-10 NOTE — ASSESSMENT & PLAN NOTE
Improving  Patient continues to have occasional pain  Lumbar spine x-ray does show mild disc space narrowing between L4 and L5   This is likely secondary to his [employer] service taking contacts patient's occupation age  He may use over-the-counter anti-inflammatory medication as needed for symptomatic relief  No more than 3000 mg of Tylenol in 1 day  No more than 2 tablets of ibuprofen with meals up to 3 times a day  Stretching exercise may help with symptoms

## 2023-03-14 ENCOUNTER — OFFICE VISIT (OUTPATIENT)
Dept: FAMILY MEDICINE CLINIC | Facility: CLINIC | Age: 33
End: 2023-03-14

## 2023-03-14 VITALS
DIASTOLIC BLOOD PRESSURE: 82 MMHG | HEART RATE: 80 BPM | HEIGHT: 70 IN | OXYGEN SATURATION: 98 % | BODY MASS INDEX: 27.49 KG/M2 | WEIGHT: 192 LBS | SYSTOLIC BLOOD PRESSURE: 124 MMHG | TEMPERATURE: 97.5 F

## 2023-03-14 DIAGNOSIS — F41.9 ANXIETY: ICD-10-CM

## 2023-03-14 DIAGNOSIS — G47.30 SLEEP APNEA, UNSPECIFIED TYPE: ICD-10-CM

## 2023-03-14 DIAGNOSIS — M51.36 LUMBAR DISC NARROWING: Primary | ICD-10-CM

## 2023-03-14 DIAGNOSIS — M51.26 LUMBAR DISC HERNIATION: ICD-10-CM

## 2023-03-14 PROBLEM — M51.369 LUMBAR DISC NARROWING: Status: ACTIVE | Noted: 2023-03-14

## 2023-03-14 NOTE — PROGRESS NOTES
Name: Dwaine García      : 1990      MRN: [id number]  Encounter Provider: Opal Garzon MD  Encounter Date: 3/14/2023   Encounter department: [address] [place] [address]     1  Lumbar disc narrowing    2  Lumbar disc herniation    3  Sleep apnea, unspecified type    4  Anxiety      Continues to have anxiety symptoms, not interested in medication at this time, anxiety seems controlled and his other medical problems including pain, sleep apnea is contributing, resolution of his pain may help with anxiety symptoms  Patient likely is experiencing disc herniation as there is presence of disc space narrowing as well as numbness and tingling of his right lower extremity, be physical therapy will benefit patient's recovery  Subjective     HPI     78-year-old male patient presents for follow-up regarding his medical condition  Patient was last seen on 10/10/2022 we discussed sleep apnea, anxiety and lumbar back pain  Patient reports his lumbar back pain continues to be his main concern  Patient's x-ray reviewed today, patient does have both pain as well as numbness and tingling radiating down the side of his right lower extremity  Patient has upcoming appointment with sleep medicine  PHQ-2/9 Depression Screening    Little interest or pleasure in doing things: 0 - not at all  Feeling down, depressed, or hopeless: 0 - not at all  PHQ-2 Score: 0  PHQ-2 Interpretation: Negative depression screen       EDUARDO-7 Flowsheet Screening    Flowsheet Row Most Recent Value   Over the last 2 weeks, how often have you been bothered by any of the following problems?     Feeling nervous, anxious, or on edge 1   Not being able to stop or control worrying 2   Worrying too much about different things 3   Trouble relaxing 3   Being so restless that it is hard to sit still 2   Becoming easily annoyed or irritable 2   Feeling afraid as if something awful might happen 3  [since he had son]   EDUARDO-7 Total Score 16              Review of Systems   Constitutional: Negative for chills and fever  HENT: Negative for congestion, postnasal drip and sore throat  Respiratory: Negative for shortness of breath  Cardiovascular: Negative for chest pain  Gastrointestinal: Negative for anal bleeding  Musculoskeletal: Positive for back pain  Neurological: Positive for headaches (migraine without CPAP)  Negative for dizziness and light-headedness  Psychiatric/Behavioral: Positive for sleep disturbance  The patient is nervous/anxious  No past medical history on file    Past Surgical History:   Procedure Laterality Date   • HERNIA REPAIR     • VASECTOMY       Family History   Problem Relation Age of Onset   • Ulcerative colitis Mother    • Cancer Father         colon     Social History     Socioeconomic History   • Marital status: /Civil Union     Spouse name: None   • Number of children: None   • Years of education: None   • Highest education level: None   Occupational History   • None   Tobacco Use   • Smoking status: Never   • Smokeless tobacco: Never   Vaping Use   • Vaping Use: Never used   Substance and Sexual Activity   • Alcohol use: Yes     Comment: rarely   • Drug use: No   • Sexual activity: None   Other Topics Concern   • None   Social History Narrative   • None     Social Determinants of Health     Financial Resource Strain: Not on file   Food Insecurity: Not on file   Transportation Needs: Not on file   Physical Activity: Not on file   Stress: Not on file   Social Connections: Not on file   Intimate Partner Violence: Not on file   Housing Stability: Not on file     Current Outpatient Medications on File Prior to Visit   Medication Sig   • cyclobenzaprine (FLEXERIL) 5 mg tablet Take 0 5 tablets (2 5 mg total) by mouth daily at bedtime (Patient not taking: Reported on 10/10/2022)   • naproxen (Naprosyn) 500 mg tablet Take 1 tablet (500 mg total) by mouth 2 (two) times a day with meals for 14 days     No Known Allergies  Immunization History   Administered Date(s) Administered   • Anthrax 06/01/2007, 12/09/2009   • COVID-19 PFIZER VACCINE 0 3 ML IM 02/19/2021, 03/10/2021   • H1N1 Inj 12/09/2009   • Hep A / Hep B 04/24/2007   • Hep A, adult 08/25/2006, 09/27/2006   • Hep B, adult 08/25/2006, 09/27/2006, 04/24/2007   • INFLUENZA 10/15/2017, 01/01/2021   • Influenza LAIV (Nasal) 12/11/2006, 09/17/2011, 09/16/2012   • Influenza Quadrivalent 3 years and older 10/19/2014, 11/15/2015, 12/06/2018, 03/19/2022   • Influenza Quadrivalent Preservative Free 3 years and older IM 10/16/2016, 11/17/2019, 01/15/2021   • Influenza Split 09/27/2006, 11/08/2007, 11/16/2008, 11/12/2010   • Influenza, Quadrivalent (nasal) 10/19/2014   • Influenza, seasonal, injectable, preservative free 01/11/2014   • Japanese Encephalitis SC 02/13/2007, 04/24/2007   • MMR 08/25/2006   • Meningococcal Polysaccharide (MPSV4) 08/25/2006   • OPV 08/25/2006   • Smallpox 04/01/2010   • Td (adult), adsorbed 08/25/2006   • Tdap 10/15/2016, 12/06/2017, 11/07/2019   • Typhoid, Unspecified 09/27/2006, 12/09/2009   • Yellow Fever 09/27/2006       Objective     /82 (BP Location: Right arm, Patient Position: Sitting, Cuff Size: Standard)   Pulse 80   Temp 97 5 °F (36 4 °C)   Ht 5' 9 75" (1 772 m)   Wt 87 1 kg (192 lb)   SpO2 98%   BMI 27 75 kg/m²     Physical Exam  Vitals reviewed  Constitutional:       General: He is not in acute distress  Appearance: Normal appearance  He is not ill-appearing, toxic-appearing or diaphoretic  Cardiovascular:      Rate and Rhythm: Normal rate and regular rhythm  Pulses: Normal pulses  Heart sounds: Normal heart sounds  No murmur heard  Pulmonary:      Effort: Pulmonary effort is normal  No respiratory distress  Breath sounds: Normal breath sounds  Abdominal:      General: Abdomen is flat  Musculoskeletal:         General: No swelling, tenderness, deformity or signs of injury   Normal range of motion  Comments: Numbness over the right lumbar spinal muscle, right greater than left, mild pain with hip flexion bilaterally, right greater than left   Skin:     General: Skin is warm and dry  Capillary Refill: Capillary refill takes less than 2 seconds  Coloration: Skin is not jaundiced  Neurological:      Mental Status: He is alert     Psychiatric:         Mood and Affect: Mood normal        Earl Prakash MD

## 2023-03-28 ENCOUNTER — RA CDI HCC (OUTPATIENT)
Dept: OTHER | Facility: HOSPITAL | Age: 33
End: 2023-03-28

## 2023-03-28 NOTE — PROGRESS NOTES
[facility]  coding opportunities       Chart reviewed, no opportunity found: CHART REVIEWED, NO OPPORTUNITY FOUND        Patients Insurance        Commercial Insurance: [address] [place] [address]

## 2023-03-31 ENCOUNTER — OFFICE VISIT (OUTPATIENT)
Dept: FAMILY MEDICINE CLINIC | Facility: CLINIC | Age: 33
End: 2023-03-31

## 2023-03-31 VITALS
WEIGHT: 196 LBS | OXYGEN SATURATION: 98 % | SYSTOLIC BLOOD PRESSURE: 128 MMHG | HEART RATE: 76 BPM | DIASTOLIC BLOOD PRESSURE: 76 MMHG | BODY MASS INDEX: 28.06 KG/M2 | TEMPERATURE: 97.6 F | HEIGHT: 70 IN

## 2023-03-31 DIAGNOSIS — R51.9 CHRONIC INTRACTABLE HEADACHE, UNSPECIFIED HEADACHE TYPE: Primary | ICD-10-CM

## 2023-03-31 DIAGNOSIS — G89.29 CHRONIC INTRACTABLE HEADACHE, UNSPECIFIED HEADACHE TYPE: Primary | ICD-10-CM

## 2023-03-31 RX ORDER — NAPROXEN 500 MG/1
500 TABLET ORAL 2 TIMES DAILY WITH MEALS
Qty: 28 TABLET | Refills: 0 | Status: SHIPPED | OUTPATIENT
Start: 2023-03-31 | End: 2023-04-14

## 2023-03-31 NOTE — PROGRESS NOTES
Name: Mic Blair      : 1990      MRN: [id number]  Encounter Provider: Kate Smith MD  Encounter Date: 3/31/2023   Encounter department: [address] [place] [address]  Chronic intractable headache, unspecified headache type  -     naproxen (Naprosyn) 500 mg tablet; Take 1 tablet (500 mg total) by mouth 2 (two) times a day with meals for 14 days  -     CT head wo contrast; Future; Expected date: 2023      2 months history of chronic intractable headache  Although mild in nature, patient also appears to be having some neurological deficits including blurry vision on the left eye, sensation of dizziness with lightheadedness on occasion  After discussion with patient will obtain CT scan of the head for evaluation  We will start patient on 2-week course of naproxen for treatment of tension headache based on areas of involvement         Subjective     HPI     68-year-old male patient presents for evaluation of ongoing chronic headache  Patient reports he started having a headache about 2 months ago and this has not subsided  Headache is almost daily in nature, mild to moderate intensity  Can be usually from 3-4 out of 10 but can exacerbate to about 6-7 out of 10 in intensity  Usually on the right side feels behind like behind the eyes however could also appear like a bandlike pattern involving the forehead and bilateral eyes  Patient denies any recent changes in the last 2 months, initially believes this was secondary to his illness about 2 months ago however believes this has been going on for too long and history recover from his URI symptoms  Denies any association with food or bowel movements  Patient does not only have symptoms on days where he is working  Does not have any association with caffeine or water intake  Has tried Tylenol and Excedrin without significant improvement  Patient has noticed slight decrease in his visual acuity on the left eye    Also feels he have slight problem with starting and finding of the words with a headache  He also experienced dizziness together with headache  Unclear if patient has been woken up due to headache because of baseline sleep apnea  He has been using CPAP on a regular basis  Patient stepfather was recently diagnosed with stroke, is currently paralyzed on the right side with craniectomy, as patient's stepfather is ready to be uncomfortable he finds this is causing patient some anxiety    Review of Systems   Constitutional: Negative for chills and fever  HENT: Negative for congestion, rhinorrhea and sore throat  Respiratory: Negative for shortness of breath  Cardiovascular: Negative for chest pain  Gastrointestinal: Negative for abdominal pain  Musculoskeletal: Positive for arthralgias (shoulder pain (wears a vest at work)) and neck pain  Neurological: Positive for dizziness, light-headedness and headaches  Negative for weakness and numbness  Psychiatric/Behavioral: Positive for sleep disturbance  No past medical history on file    Past Surgical History:   Procedure Laterality Date   • HERNIA REPAIR     • VASECTOMY       Family History   Problem Relation Age of Onset   • Ulcerative colitis Mother    • Cancer Father         colon     Social History     Socioeconomic History   • Marital status: /Civil Union     Spouse name: None   • Number of children: None   • Years of education: None   • Highest education level: None   Occupational History   • None   Tobacco Use   • Smoking status: Never   • Smokeless tobacco: Never   Vaping Use   • Vaping Use: Never used   Substance and Sexual Activity   • Alcohol use: Yes     Comment: rarely   • Drug use: No   • Sexual activity: None   Other Topics Concern   • None   Social History Narrative   • None     Social Determinants of Health     Financial Resource Strain: Not on file   Food Insecurity: Not on file   Transportation Needs: Not on file   Physical "Activity: Not on file   Stress: Not on file   Social Connections: Not on file   Intimate Partner Violence: Not on file   Housing Stability: Not on file     Current Outpatient Medications on File Prior to Visit   Medication Sig   • cyclobenzaprine (FLEXERIL) 5 mg tablet Take 0 5 tablets (2 5 mg total) by mouth daily at bedtime (Patient not taking: Reported on 10/10/2022)   • [DISCONTINUED] naproxen (Naprosyn) 500 mg tablet Take 1 tablet (500 mg total) by mouth 2 (two) times a day with meals for 14 days (Patient not taking: Reported on 3/31/2023)     No Known Allergies  Immunization History   Administered Date(s) Administered   • Anthrax 06/01/2007, 12/09/2009   • COVID-19 PFIZER VACCINE 0 3 ML IM 02/19/2021, 03/10/2021   • H1N1 Inj 12/09/2009   • Hep A / Hep B 04/24/2007   • Hep A, adult 08/25/2006, 09/27/2006   • Hep B, adult 08/25/2006, 09/27/2006, 04/24/2007   • INFLUENZA 10/15/2017, 01/01/2021   • Influenza LAIV (Nasal) 12/11/2006, 09/17/2011, 09/16/2012   • Influenza Quadrivalent 3 years and older 10/19/2014, 11/15/2015, 12/06/2018, 03/19/2022   • Influenza Quadrivalent Preservative Free 3 years and older IM 10/16/2016, 11/17/2019, 01/15/2021   • Influenza Split 09/27/2006, 11/08/2007, 11/16/2008, 11/12/2010   • Influenza, Quadrivalent (nasal) 10/19/2014   • Influenza, seasonal, injectable, preservative free 01/11/2014   • Japanese Encephalitis SC 02/13/2007, 04/24/2007   • MMR 08/25/2006   • Meningococcal Polysaccharide (MPSV4) 08/25/2006   • OPV 08/25/2006   • Smallpox 04/01/2010   • Td (adult), adsorbed 08/25/2006   • Tdap 10/15/2016, 12/06/2017, 11/07/2019   • Typhoid, Unspecified 09/27/2006, 12/09/2009   • Yellow Fever 09/27/2006       Objective     /76 (BP Location: Right arm, Patient Position: Sitting, Cuff Size: Standard)   Pulse 76   Temp 97 6 °F (36 4 °C)   Ht 5' 9 75\" (1 772 m)   Wt 88 9 kg (196 lb)   SpO2 98%   BMI 28 33 kg/m²     Physical Exam  Vitals reviewed     Constitutional:       " General: He is not in acute distress  Appearance: Normal appearance  He is well-developed  He is not ill-appearing, toxic-appearing or diaphoretic  HENT:      Head: Normocephalic and atraumatic  Nose: Nose normal    Eyes:      Pupils: Pupils are equal, round, and reactive to light  Cardiovascular:      Rate and Rhythm: Normal rate and regular rhythm  Pulses: Normal pulses  Heart sounds: Normal heart sounds  No murmur heard  No friction rub  No gallop  Pulmonary:      Effort: Pulmonary effort is normal  No respiratory distress  Breath sounds: Normal breath sounds  Abdominal:      General: Bowel sounds are normal  There is no distension  Palpations: Abdomen is soft  Tenderness: There is no abdominal tenderness  There is no guarding  Musculoskeletal:         General: No swelling, tenderness, deformity or signs of injury  Normal range of motion  Skin:     General: Skin is warm and dry  Capillary Refill: Capillary refill takes less than 2 seconds  Coloration: Skin is not jaundiced  Findings: No erythema or rash  Neurological:      General: No focal deficit present  Mental Status: He is alert and oriented to person, place, and time  Mental status is at baseline  Cranial Nerves: No cranial nerve deficit  Sensory: No sensory deficit  Motor: No weakness        Coordination: Coordination normal       Gait: Gait normal       Deep Tendon Reflexes: Reflexes normal    Psychiatric:         Mood and Affect: Mood normal             Janeth Aceves MD

## 2023-04-20 ENCOUNTER — HOSPITAL ENCOUNTER (OUTPATIENT)
Dept: CT IMAGING | Facility: HOSPITAL | Age: 33
Discharge: HOME | End: 2023-04-20
Attending: FAMILY MEDICINE

## 2023-04-20 DIAGNOSIS — G89.29 CHRONIC INTRACTABLE HEADACHE, UNSPECIFIED HEADACHE TYPE: ICD-10-CM

## 2023-04-20 DIAGNOSIS — R51.9 CHRONIC INTRACTABLE HEADACHE, UNSPECIFIED HEADACHE TYPE: ICD-10-CM

## 2023-06-20 ENCOUNTER — OFFICE VISIT (OUTPATIENT)
Dept: FAMILY MEDICINE CLINIC | Facility: CLINIC | Age: 33
End: 2023-06-20
Payer: COMMERCIAL

## 2023-06-20 VITALS
SYSTOLIC BLOOD PRESSURE: 118 MMHG | HEART RATE: 56 BPM | BODY MASS INDEX: 26.97 KG/M2 | OXYGEN SATURATION: 97 % | WEIGHT: 188.4 LBS | TEMPERATURE: 97.8 F | DIASTOLIC BLOOD PRESSURE: 72 MMHG | HEIGHT: 70 IN

## 2023-06-20 DIAGNOSIS — Z00.00 ANNUAL PHYSICAL EXAM: Primary | ICD-10-CM

## 2023-06-20 PROCEDURE — 99395 PREV VISIT EST AGE 18-39: CPT | Performed by: FAMILY MEDICINE

## 2023-06-20 NOTE — PATIENT INSTRUCTIONS
[facility]      Wellness Visit for Adults   AMBULATORY CARE:   A wellness visit  is when you see your healthcare provider to get screened for health problems  Your healthcare provider will also give you advice on how to stay healthy  Write down your questions so you remember to ask them  Ask your healthcare provider how often you should have a wellness visit  What happens at a wellness visit:  Your healthcare provider will ask about your health, and your family history of health problems  This includes high blood pressure, heart disease, and cancer  He or she will ask if you have symptoms that concern you, if you smoke, and about your mood  You may also be asked about your intake of medicines, supplements, food, and alcohol  Any of the following may be done: Your weight  will be checked  Your height may also be checked so your body mass index (BMI) can be calculated  Your BMI shows if you are at a healthy weight  Your blood pressure  and heart rate will be checked  Your temperature may also be checked  Blood and urine tests  may be done  Blood tests may be done to check your cholesterol levels  Abnormal cholesterol levels increase your risk for heart disease and stroke  You may also need a blood or urine test to check for diabetes if you are at increased risk  Urine tests may be done to look for signs of an infection or kidney disease  A physical exam  includes checking your heartbeat and lungs with a stethoscope  Your healthcare provider may also check your skin to look for sun damage  Screening tests  may be recommended  A screening test is done to check for diseases that may not cause symptoms  The screening tests you may need depend on your age, gender, family history, and lifestyle habits  For example, colorectal screening may be recommended if you are 48years old or older  Screening tests you need if you are a woman:   A Pap smear  is used to screen for cervical cancer   Pap smears are usually done every 3 to 5 years depending on your age  You may need them more often if you have had abnormal Pap smear test results in the past  Ask your healthcare provider how often you should have a Pap smear  A mammogram  is an x-ray of your breasts to screen for breast cancer  Experts recommend mammograms every 2 years starting at age 48 years  You may need a mammogram at age 52 years or younger if you have an increased risk for breast cancer  Talk to your healthcare provider about when you should start having mammograms and how often you need them  Vaccines you may need:   Get an influenza vaccine  every year  The influenza vaccine protects you from the flu  Several types of viruses cause the flu  The viruses change over time, so new vaccines are made each year  Get a tetanus-diphtheria (Td) booster vaccine  every 10 years  This vaccine protects you against tetanus and diphtheria  Tetanus is a severe infection that may cause painful muscle spasms and lockjaw  Diphtheria is a severe bacterial infection that causes a thick covering in the back of your mouth and throat  Get a human papillomavirus (HPV) vaccine  if you are female and aged 23 to 32 or male 23 to 24 and never received it  This vaccine protects you from HPV infection  HPV is the most common infection spread by sexual contact  HPV may also cause vaginal, penile, and anal cancers  Get a pneumococcal vaccine  if you are aged 72 years or older  The pneumococcal vaccine is an injection given to protect you from pneumococcal disease  Pneumococcal disease is an infection caused by pneumococcal bacteria  The infection may cause pneumonia, meningitis, or an ear infection  Get a shingles vaccine  if you are 60 or older, even if you have had shingles before  The shingles vaccine is an injection to protect you from the varicella-zoster virus  This is the same virus that causes chickenpox   Shingles is a painful rash that develops in people who had chickenpox or have been exposed to the virus  How to eat healthy:  My Plate is a model for planning healthy meals  It shows the types and amounts of foods that should go on your plate  Fruits and vegetables make up about half of your plate, and grains and protein make up the other half  A serving of dairy is included on the side of your plate  The amount of calories and serving sizes you need depends on your age, gender, weight, and height  Examples of healthy foods are listed below:  Eat a variety of vegetables  such as dark green, red, and orange vegetables  You can also include canned vegetables low in sodium (salt) and frozen vegetables without added butter or sauces  Eat a variety of fresh fruits , canned fruit in 100% juice, frozen fruit, and dried fruit  Include whole grains  At least half of the grains you eat should be whole grains  Examples include whole-wheat bread, wheat pasta, brown rice, and whole-grain cereals such as oatmeal     Eat a variety of protein foods such as seafood (fish and shellfish), lean meat, and poultry without skin (turkey and chicken)  Examples of lean meats include pork leg, shoulder, or tenderloin, and beef round, sirloin, tenderloin, and extra lean ground beef  Other protein foods include eggs and egg substitutes, beans, peas, soy products, nuts, and seeds  Choose low-fat dairy products such as skim or 1% milk or low-fat yogurt, cheese, and cottage cheese  Limit unhealthy fats  such as butter, hard margarine, and shortening  Exercise:  Exercise at least 30 minutes per day on most days of the week  Some examples of exercise include walking, biking, dancing, and swimming  You can also fit in more physical activity by taking the stairs instead of the elevator or parking farther away from stores  Include muscle strengthening activities 2 days each week  Regular exercise provides many health benefits   It helps you manage your weight, and decreases your risk for type 2 diabetes, heart disease, stroke, and high blood pressure  Exercise can also help improve your mood  Ask your healthcare provider about the best exercise plan for you  General health and safety guidelines:   Do not smoke  Nicotine and other chemicals in cigarettes and cigars can cause lung damage  Ask your healthcare provider for information if you currently smoke and need help to quit  E-cigarettes or smokeless tobacco still contain nicotine  Talk to your healthcare provider before you use these products  Limit alcohol  A drink of alcohol is 12 ounces of beer, 5 ounces of wine, or 1½ ounces of liquor  Lose weight, if needed  Being overweight increases your risk of certain health conditions  These include heart disease, high blood pressure, type 2 diabetes, and certain types of cancer  Protect your skin  Do not sunbathe or use tanning beds  Use sunscreen with a SPF 15 or higher  Apply sunscreen at least 15 minutes before you go outside  Reapply sunscreen every 2 hours  Wear protective clothing, hats, and sunglasses when you are outside  Drive safely  Always wear your seatbelt  Make sure everyone in your car wears a seatbelt  A seatbelt can save your life if you are in an accident  Do not use your cell phone when you are driving  This could distract you and cause an accident  Pull over if you need to make a call or send a text message  Practice safe sex  Use latex condoms if are sexually active and have more than one partner  Your healthcare provider may recommend screening tests for sexually transmitted infections (STIs)  Wear helmets, lifejackets, and protective gear  Always wear a helmet when you ride a bike or motorcycle, go skiing, or play sports that could cause a head injury  Wear protective equipment when you play sports  Wear a lifejacket when you are on a boat or doing water sports      © Copyright [employer] 2022 Information is for End User's use only and may not be sold, redistributed or otherwise used for commercial purposes  The above information is an  only  It is not intended as medical advice for individual conditions or treatments  Talk to your doctor, nurse or pharmacist before following any medical regimen to see if it is safe and effective for you

## 2023-06-20 NOTE — PROGRESS NOTES
[address] [place] [address]    NAME: Yuliana Wilde  AGE: 26 y o  SEX: male  : 1990     DATE: 2023     Assessment and Plan:     Problem List Items Addressed This Visit        Other    Annual physical exam - Primary       Immunizations and preventive care screenings were discussed with patient today  Appropriate education was printed on patient's after visit summary  Counseling:  Alcohol/drug use: discussed moderation in alcohol intake, the recommendations for healthy alcohol use, and avoidance of illicit drug use  Dental Health: discussed importance of regular tooth brushing, flossing, and dental visits  Injury prevention: discussed safety/seat belts, safety helmets, smoke detectors, carbon dioxide detectors, and smoking near bedding or upholstery  Sexual health: discussed sexually transmitted diseases, partner selection, use of condoms, avoidance of unintended pregnancy, and contraceptive alternatives  · Exercise: the importance of regular exercise/physical activity was discussed  Recommend exercise 3-5 times per week for at least 30 minutes  BMI Counseling: Body mass index is 27 23 kg/m²  The BMI is above normal  Nutrition recommendations include reducing portion sizes, 3-5 servings of fruits/vegetables daily and consuming healthier snacks  Exercise recommendations include moderate aerobic physical activity for 150 minutes/week  No follow-ups on file  Chief Complaint:     Chief Complaint   Patient presents with   • Annual Exam     Annual Exam / Review MRI      History of Present Illness:     Adult Annual Physical   Patient here for a comprehensive physical exam  The patient reports problems - headache improving  migraine likely contributing from sleep apnea       Diet and Physical Activity  · Diet/Nutrition: well balanced diet and consuming 3-5 servings of fruits/vegetables daily     · Exercise: 3-4 times a week on average  Depression Screening  PHQ-2/9 Depression Screening    Little interest or pleasure in doing things: 0 - not at all  Feeling down, depressed, or hopeless: 0 - not at all  PHQ-2 Score: 0  PHQ-2 Interpretation: Negative depression screen       General Health  · Sleep: has sleep apnea   · Hearing: normal - bilateral  Tinnitus   · Vision: no vision problems  · Dental: regular dental visits and brushes teeth twice daily   Health  · History of STDs?: no      Review of Systems:     Review of Systems   Constitutional: Negative for chills and fever  HENT: Negative for congestion, rhinorrhea and sore throat  Respiratory: Negative for chest tightness and shortness of breath  Cardiovascular: Negative for chest pain  Gastrointestinal: Negative for abdominal pain, constipation, diarrhea, nausea and vomiting  Musculoskeletal: Positive for back pain  Neurological: Positive for headaches (improving, twice a week)  Psychiatric/Behavioral: Positive for sleep disturbance  Past Medical History:     No past medical history on file     Past Surgical History:     Past Surgical History:   Procedure Laterality Date   • HERNIA REPAIR     • VASECTOMY        Social History:     Social History     Socioeconomic History   • Marital status: /Civil Union     Spouse name: None   • Number of children: None   • Years of education: None   • Highest education level: None   Occupational History   • None   Tobacco Use   • Smoking status: Never   • Smokeless tobacco: Never   Vaping Use   • Vaping Use: Never used   Substance and Sexual Activity   • Alcohol use: Yes     Comment: rarely   • Drug use: No   • Sexual activity: None   Other Topics Concern   • None   Social History Narrative   • None     Social Determinants of Health     Financial Resource Strain: Not on file   Food Insecurity: Not on file   Transportation Needs: Not on file   Physical Activity: Not on file   Stress: Not on file   Social "Connections: Not on file   Intimate Partner Violence: Not on file   Housing Stability: Not on file      Family History:     Family History   Problem Relation Age of Onset   • Ulcerative colitis Mother    • Cancer Father         colon      Current Medications:     Current Outpatient Medications   Medication Sig Dispense Refill   • cyclobenzaprine (FLEXERIL) 5 mg tablet Take 0 5 tablets (2 5 mg total) by mouth daily at bedtime (Patient not taking: Reported on 10/10/2022) 30 tablet 0   • naproxen (Naprosyn) 500 mg tablet Take 1 tablet (500 mg total) by mouth 2 (two) times a day with meals for 14 days 28 tablet 0     No current facility-administered medications for this visit  Allergies:     No Known Allergies   Physical Exam:     /72 (BP Location: Right arm, Patient Position: Sitting, Cuff Size: Standard)   Pulse 56   Temp 97 8 °F (36 6 °C)   Ht 5' 9 75\" (1 772 m)   Wt 85 5 kg (188 lb 6 4 oz)   SpO2 97%   BMI 27 23 kg/m²     Physical Exam  Vitals reviewed  Constitutional:       General: He is not in acute distress  Appearance: Normal appearance  He is not ill-appearing, toxic-appearing or diaphoretic  Cardiovascular:      Rate and Rhythm: Normal rate and regular rhythm  Pulses: Normal pulses  Heart sounds: Normal heart sounds  No murmur heard  Pulmonary:      Effort: Pulmonary effort is normal  No respiratory distress  Breath sounds: Normal breath sounds  Abdominal:      General: Abdomen is flat  Musculoskeletal:         General: No swelling or deformity  Normal range of motion  Skin:     General: Skin is warm and dry  Capillary Refill: Capillary refill takes less than 2 seconds  Coloration: Skin is not jaundiced  Neurological:      General: No focal deficit present  Mental Status: He is alert and oriented to person, place, and time     Psychiatric:         Mood and Affect: Mood normal           MD Meet [facility]  "

## 2023-11-14 ENCOUNTER — TELEPHONE (OUTPATIENT)
Age: 33
End: 2023-11-14

## 2023-11-14 NOTE — TELEPHONE ENCOUNTER
11/14/23  Screened by: Raymond Morin MA    Referring Provider pcp    Pre- Screening: There is no height or weight on file to calculate BMI. Has patient been referred for a routine screening Colonoscopy? no  Is the patient between 43-73 years old? no      Previous Colonoscopy yes   If yes:    Date: 5 years ago    Facility:     Reason:       SCHEDULING STAFF: If the patient is between 45yrs-49yrs, please advise patient to confirm benefits/coverage with their insurance company for a routine screening colonoscopy, some insurance carriers will only cover at [address] [place] [address] or older. If the patient is over 66years old, please schedule an office visit. Does the patient want to see a Gastroenterologist prior to their procedure OR are they having any GI symptoms? no    Has the patient been hospitalized or had abdominal surgery in the past 6 months? no    Does the patient use supplemental oxygen? no    Does the patient take Coumadin, Lovenox, Plavix, Elliquis, Xarelto, or other blood thinning medication? no    Has the patient had a stroke, cardiac event, or stent placed in the past year? no    SCHEDULING STAFF: If patient answers NO to above questions, then schedule procedure. If patient answers YES to above questions, then schedule office appointment. Failed oa    If patient is between 45yrs - 49yrs, please advise patient that we will have to confirm benefits & coverage with their insurance company for a routine screening colonoscopy.

## 2023-12-20 ENCOUNTER — TELEPHONE (OUTPATIENT)
Dept: GASTROENTEROLOGY | Facility: CLINIC | Age: 33
End: 2023-12-20

## 2023-12-20 ENCOUNTER — OFFICE VISIT (OUTPATIENT)
Dept: GASTROENTEROLOGY | Facility: CLINIC | Age: 33
End: 2023-12-20
Payer: COMMERCIAL

## 2023-12-20 VITALS
DIASTOLIC BLOOD PRESSURE: 67 MMHG | WEIGHT: 184 LBS | BODY MASS INDEX: 26.34 KG/M2 | SYSTOLIC BLOOD PRESSURE: 120 MMHG | HEART RATE: 45 BPM | HEIGHT: 70 IN

## 2023-12-20 DIAGNOSIS — Z80.0 FAMILY HX OF COLON CANCER: Primary | ICD-10-CM

## 2023-12-20 DIAGNOSIS — Z86.010 HX OF COLONIC POLYPS: ICD-10-CM

## 2023-12-20 DIAGNOSIS — Z12.11 COLON CANCER SCREENING: ICD-10-CM

## 2023-12-20 DIAGNOSIS — E80.4 GILBERT'S SYNDROME: ICD-10-CM

## 2023-12-20 PROBLEM — Z86.0100 HX OF COLONIC POLYPS: Status: ACTIVE | Noted: 2023-12-20

## 2023-12-20 PROCEDURE — 99213 OFFICE O/P EST LOW 20 MIN: CPT | Performed by: NURSE PRACTITIONER

## 2023-12-20 NOTE — PROGRESS NOTES
[facility] - Outpatient Follow-up Note  Alfred Behr 33 y.o. male MRN: [id number]  Encounter: [id number]          ASSESSMENT AND PLAN:      1. Family hx of colon cancer  2. Hx of colonic polyps  Positive family history colon cancer father diagnosed at age 50 and paternal uncles.Colonoscopy done 12/19/2018 showed 1 colon polyp removed and internal hemorrhoids.  Biopsy showed hyperplastic polyp. Patient due for surveillance colonoscopy.   -Colonoscopy; Schedule for colonoscopy.  Prep and procedure explained detail.  Further recommendations pending results of colonoscopy.  -Miralax and Ducolax bowel prep    3. Gilbert's syndrome  Patient has history of Gilbert's syndrome.    4. Colon cancer screening  Up-to-date.    ______________________________________________________________________    SUBJECTIVE: This is a pleasant 33-year-old male who is a  who presents to office for follow-up.  Patient has history of Gilbert's syndrome.  Patient is due for surveillance colonoscopy due to history of colon polyps and family history of colon cancer father diagnosed at age 50, paternal grandparent diagnosed at age 40 and paternal uncles.  Patient currently denies any GI issues.  Patient denies nausea, vomiting, acid reflux, heartburn, epigastric or abdominal pain.  Patient denies blood in stool, blood from rectal area, or black tarry stool.  Abdomen exam benign, no abdominal tenderness or guarding.  Patient does have history of Gilbert's syndrome.  Patient drinks alcohol socially.  Patient does not smoke.  Patient does not use illicit drugs or marijuana.      REVIEW OF SYSTEMS IS OTHERWISE NEGATIVE.      Historical Information   History reviewed. No pertinent past medical history.  Past Surgical History:   Procedure Laterality Date    HERNIA REPAIR      VASECTOMY       Social History   Social History     Substance and Sexual Activity   Alcohol Use Yes    Comment: rarely     Social History  "    Substance and Sexual Activity   Drug Use No     Social History     Tobacco Use   Smoking Status Never   Smokeless Tobacco Never     Family History   Problem Relation Age of Onset    Ulcerative colitis Mother     Cancer Father         colon       Meds/Allergies       Current Outpatient Medications:     cyclobenzaprine (FLEXERIL) 5 mg tablet    naproxen (Naprosyn) 500 mg tablet    No Known Allergies        Objective     Blood pressure 120/67, pulse (!) 45, height 5' 9.75\" (1.772 m), weight 83.5 kg (184 lb). Body mass index is 26.59 kg/m².      PHYSICAL EXAM:      General Appearance:   Alert, cooperative, no distress   HEENT:   Normocephalic, atraumatic, anicteric.     Neck:  Supple, symmetrical, trachea midline   Lungs:   Clear to auscultation bilaterally; no rales, rhonchi or wheezing; respirations unlabored    Heart::   Regular rate and rhythm; no murmur, rub, or gallop.   Abdomen:   Soft, non-tender, non-distended; normal bowel sounds; no masses, no organomegaly    Genitalia:   Deferred    Rectal:   Deferred    Extremities:  No cyanosis, clubbing or edema    Pulses:  2+ and symmetric    Skin:  No jaundice, rashes, or lesions    Lymph nodes:  No palpable cervical lymphadenopathy        Lab Results:   No visits with results within 1 Day(s) from this visit.   Latest known visit with results is:   Orders Only on 11/01/2021   Component Date Value    Hemoglobin A1C 04/16/2021 5.4          Radiology Results:   No results found.  "

## 2023-12-20 NOTE — TELEPHONE ENCOUNTER
Scheduled date of colonoscopy (as of today):03/13/24  Physician performing colonoscopy:Dr. Domingo  Location of colonoscopy:[facility]  Bowel prep reviewed with patient:Miralax. Dul  Instructions reviewed with patient by:richardson  Clearances: n/a

## 2024-02-28 ENCOUNTER — ANESTHESIA (OUTPATIENT)
Dept: ANESTHESIOLOGY | Facility: AMBULATORY SURGERY CENTER | Age: 34
End: 2024-02-28

## 2024-02-28 ENCOUNTER — ANESTHESIA EVENT (OUTPATIENT)
Dept: ANESTHESIOLOGY | Facility: AMBULATORY SURGERY CENTER | Age: 34
End: 2024-02-28

## 2024-03-06 ENCOUNTER — TELEPHONE (OUTPATIENT)
Dept: GASTROENTEROLOGY | Facility: CLINIC | Age: 34
End: 2024-03-06

## 2024-03-06 NOTE — TELEPHONE ENCOUNTER
lmom confirming pt's colonoscopy scheduled on 3/13/24 at [facility] with  Dr Domingo.  Informed [facility] would be calling 1-2 days prior with the arrival time.  Informed of clear liquid diet day prior as well as the bowel cleansing preparation.  Informed would need a  the day of the procedure due to being under sedation. I asked pt to please call back if has not received instructions or if has any questions.

## 2024-03-08 NOTE — TELEPHONE ENCOUNTER
Spoke to pt confirming pt's colonoscopy scheduled on 3/13/24 at [facility] with  Dr Domingo.  Informed [facility] would be calling 1-2 days prior with the arrival time. Pt has instructions and did not have any questions.

## 2024-03-12 RX ORDER — LIDOCAINE HYDROCHLORIDE 10 MG/ML
0.5 INJECTION, SOLUTION EPIDURAL; INFILTRATION; INTRACAUDAL; PERINEURAL ONCE AS NEEDED
Status: CANCELLED | OUTPATIENT
Start: 2024-03-12

## 2024-03-13 ENCOUNTER — ANESTHESIA EVENT (OUTPATIENT)
Dept: GASTROENTEROLOGY | Facility: AMBULATORY SURGERY CENTER | Age: 34
End: 2024-03-13

## 2024-03-13 ENCOUNTER — ANESTHESIA (OUTPATIENT)
Dept: GASTROENTEROLOGY | Facility: AMBULATORY SURGERY CENTER | Age: 34
End: 2024-03-13

## 2024-03-13 ENCOUNTER — HOSPITAL ENCOUNTER (OUTPATIENT)
Dept: GASTROENTEROLOGY | Facility: AMBULATORY SURGERY CENTER | Age: 34
Discharge: HOME | End: 2024-03-13
Payer: COMMERCIAL

## 2024-03-13 VITALS
OXYGEN SATURATION: 98 % | HEART RATE: 52 BPM | WEIGHT: 180 LBS | SYSTOLIC BLOOD PRESSURE: 140 MMHG | RESPIRATION RATE: 18 BRPM | HEIGHT: 70 IN | TEMPERATURE: 97 F | BODY MASS INDEX: 25.77 KG/M2 | DIASTOLIC BLOOD PRESSURE: 77 MMHG

## 2024-03-13 DIAGNOSIS — Z80.0 FAMILY HX OF COLON CANCER: ICD-10-CM

## 2024-03-13 DIAGNOSIS — Z86.010 HX OF COLONIC POLYPS: ICD-10-CM

## 2024-03-13 PROCEDURE — G0105 COLORECTAL SCRN; HI RISK IND: HCPCS | Performed by: INTERNAL MEDICINE

## 2024-03-13 RX ORDER — SODIUM CHLORIDE, SODIUM LACTATE, POTASSIUM CHLORIDE, CALCIUM CHLORIDE 600; 310; 30; 20 MG/100ML; MG/100ML; MG/100ML; MG/100ML
INJECTION, SOLUTION INTRAVENOUS CONTINUOUS PRN
Status: DISCONTINUED | OUTPATIENT
Start: 2024-03-13 | End: 2024-03-13

## 2024-03-13 RX ORDER — PROPOFOL 10 MG/ML
INJECTION, EMULSION INTRAVENOUS AS NEEDED
Status: DISCONTINUED | OUTPATIENT
Start: 2024-03-13 | End: 2024-03-13

## 2024-03-13 RX ORDER — LIDOCAINE HYDROCHLORIDE 10 MG/ML
0.5 INJECTION, SOLUTION EPIDURAL; INFILTRATION; INTRACAUDAL; PERINEURAL ONCE AS NEEDED
Status: DISCONTINUED | OUTPATIENT
Start: 2024-03-13 | End: 2024-03-17 | Stop reason: HOSPADM

## 2024-03-13 RX ORDER — LIDOCAINE HYDROCHLORIDE 10 MG/ML
INJECTION, SOLUTION EPIDURAL; INFILTRATION; INTRACAUDAL; PERINEURAL AS NEEDED
Status: DISCONTINUED | OUTPATIENT
Start: 2024-03-13 | End: 2024-03-13

## 2024-03-13 RX ADMIN — PROPOFOL 50 MG: 10 INJECTION, EMULSION INTRAVENOUS at 12:06

## 2024-03-13 RX ADMIN — PROPOFOL 50 MG: 10 INJECTION, EMULSION INTRAVENOUS at 12:03

## 2024-03-13 RX ADMIN — PROPOFOL 20 MG: 10 INJECTION, EMULSION INTRAVENOUS at 12:08

## 2024-03-13 RX ADMIN — PROPOFOL 40 MG: 10 INJECTION, EMULSION INTRAVENOUS at 12:01

## 2024-03-13 RX ADMIN — SODIUM CHLORIDE, SODIUM LACTATE, POTASSIUM CHLORIDE, CALCIUM CHLORIDE: 600; 310; 30; 20 INJECTION, SOLUTION INTRAVENOUS at 11:55

## 2024-03-13 RX ADMIN — LIDOCAINE HYDROCHLORIDE 80 MG: 10 INJECTION, SOLUTION EPIDURAL; INFILTRATION; INTRACAUDAL; PERINEURAL at 11:59

## 2024-03-13 RX ADMIN — PROPOFOL 160 MG: 10 INJECTION, EMULSION INTRAVENOUS at 11:59

## 2024-03-13 RX ADMIN — PROPOFOL 20 MG: 10 INJECTION, EMULSION INTRAVENOUS at 12:10

## 2024-03-13 NOTE — ANESTHESIA PREPROCEDURE EVALUATION
Procedure:  COLONOSCOPY    Relevant Problems   MUSCULOSKELETAL   (+) Lumbar back pain   (+) Patellar tendinitis of both knees      NEURO/PSYCH   (+) Anxiety   (+) Chronic intractable headache   (+) Chronic right shoulder pain      PULMONARY   (+) Sleep apnea      Other   (+) Gilbert's syndrome        Physical Exam    Airway    Mallampati score: II  TM Distance: >3 FB  Neck ROM: full     Dental   No notable dental hx     Cardiovascular  Rhythm: regular, Rate: normal, Cardiovascular exam normal    Pulmonary  Pulmonary exam normal Breath sounds clear to auscultation    Other Findings        Anesthesia Plan  ASA Score- 2     Anesthesia Type- IV sedation with anesthesia with ASA Monitors.         Additional Monitors:     Airway Plan:     Comment: Discussed risks/benefits, including medication reactions, awareness, aspiration, and serious/life threatening complications.    Plan to maintain native airway with IVGA, monitored with EtCO2.       Plan Factors-Exercise tolerance (METS): >4 METS.    Chart reviewed.    Patient summary reviewed.      Patient instructed to abstain from smoking on day of procedure. Patient did not smoke on day of surgery.            Induction- intravenous.    Postoperative Plan-     Informed Consent- Anesthetic plan and risks discussed with patient.  I personally reviewed this patient with the CRNA. Discussed and agreed on the Anesthesia Plan with the CRNA..

## 2024-03-13 NOTE — H&P
"History and Physical -  [facility]  Alfred Behr 33 y.o. male MRN: [id number]                  HPI: Alfred Behr is a 33 y.o. year old male who presents for history of colon polyp      REVIEW OF SYSTEMS: Per the HPI, and otherwise unremarkable.    Historical Information   Past Medical History:   Diagnosis Date    Colon polyp     CPAP (continuous positive airway pressure) dependence     Sleep apnea      Past Surgical History:   Procedure Laterality Date    COLONOSCOPY      HERNIA REPAIR      VASECTOMY       Social History   Social History     Substance and Sexual Activity   Alcohol Use Not Currently     Social History     Substance and Sexual Activity   Drug Use No     Social History     Tobacco Use   Smoking Status Never   Smokeless Tobacco Never     Family History   Problem Relation Age of Onset    Ulcerative colitis Mother     Colon cancer Father     Cancer Father         colon    Colon cancer Paternal Grandfather     Colon cancer Paternal Uncle        Meds/Allergies     No current outpatient medications on file.    Current Facility-Administered Medications:     lidocaine (PF) (XYLOCAINE-MPF) 1 % injection 0.5 mL, 0.5 mL, Infiltration, Once PRN    No Known Allergies    Objective     /76   Pulse (!) 52 Comment: nsr  Temp (!) 97 °F (36.1 °C) (Skin)   Resp 18   Ht 5' 10\" (1.778 m)   Wt 81.6 kg (180 lb)   SpO2 97%   BMI 25.83 kg/m²       PHYSICAL EXAM    Gen: NAD  Head: NCAT  CV: RRR  CHEST: Clear  ABD: soft, NT/ND  EXT: no edema      ASSESSMENT/PLAN:  This is a 33 y.o. year old male here for colonoscopy, and he is stable and optimized for his procedure.        "

## 2024-03-13 NOTE — ANESTHESIA POSTPROCEDURE EVALUATION
Post-Op Assessment Note    CV Status:  Stable  Pain Score: 0    Pain management: adequate       Mental Status:  Sleepy and arousable   Hydration Status:  Stable   PONV Controlled:  None   Airway Patency:  Patent     Post Op Vitals Reviewed: Yes    No anethesia notable event occurred.    Staff: CRNA               BP  104/55    Temp      Pulse 53   Resp 16   SpO2 97 RA

## 2024-06-05 ENCOUNTER — APPOINTMENT (OUTPATIENT)
Dept: LAB | Facility: MEDICAL CENTER | Age: 34
End: 2024-06-05
Payer: COMMERCIAL

## 2024-06-05 ENCOUNTER — OFFICE VISIT (OUTPATIENT)
Dept: FAMILY MEDICINE CLINIC | Facility: CLINIC | Age: 34
End: 2024-06-05
Payer: COMMERCIAL

## 2024-06-05 ENCOUNTER — APPOINTMENT (OUTPATIENT)
Dept: RADIOLOGY | Facility: MEDICAL CENTER | Age: 34
End: 2024-06-05
Payer: COMMERCIAL

## 2024-06-05 VITALS
DIASTOLIC BLOOD PRESSURE: 68 MMHG | HEIGHT: 70 IN | OXYGEN SATURATION: 98 % | WEIGHT: 181.2 LBS | BODY MASS INDEX: 25.94 KG/M2 | HEART RATE: 58 BPM | TEMPERATURE: 97.7 F | SYSTOLIC BLOOD PRESSURE: 114 MMHG

## 2024-06-05 DIAGNOSIS — S93.401A SPRAIN AND STRAIN OF RIGHT ANKLE: Primary | ICD-10-CM

## 2024-06-05 DIAGNOSIS — R35.0 INCREASED URINARY FREQUENCY: ICD-10-CM

## 2024-06-05 DIAGNOSIS — L65.9 THINNING HAIR: ICD-10-CM

## 2024-06-05 DIAGNOSIS — S96.911A SPRAIN AND STRAIN OF RIGHT ANKLE: Primary | ICD-10-CM

## 2024-06-05 DIAGNOSIS — S96.911A SPRAIN AND STRAIN OF RIGHT ANKLE: ICD-10-CM

## 2024-06-05 DIAGNOSIS — S93.401A SPRAIN AND STRAIN OF RIGHT ANKLE: ICD-10-CM

## 2024-06-05 LAB
ALBUMIN SERPL BCP-MCNC: 4.5 G/DL (ref 3.5–5)
ALP SERPL-CCNC: 56 U/L (ref 34–104)
ALT SERPL W P-5'-P-CCNC: 16 U/L (ref 7–52)
ANION GAP SERPL CALCULATED.3IONS-SCNC: 6 MMOL/L (ref 4–13)
AST SERPL W P-5'-P-CCNC: 20 U/L (ref 13–39)
BASOPHILS # BLD AUTO: 0.04 THOUSANDS/ÂΜL (ref 0–0.1)
BASOPHILS NFR BLD AUTO: 1 % (ref 0–1)
BILIRUB SERPL-MCNC: 0.57 MG/DL (ref 0.2–1)
BUN SERPL-MCNC: 21 MG/DL (ref 5–25)
CALCIUM SERPL-MCNC: 9.3 MG/DL (ref 8.4–10.2)
CHLORIDE SERPL-SCNC: 106 MMOL/L (ref 96–108)
CO2 SERPL-SCNC: 26 MMOL/L (ref 21–32)
CREAT SERPL-MCNC: 1.02 MG/DL (ref 0.6–1.3)
EOSINOPHIL # BLD AUTO: 0.06 THOUSAND/ÂΜL (ref 0–0.61)
EOSINOPHIL NFR BLD AUTO: 1 % (ref 0–6)
ERYTHROCYTE [DISTWIDTH] IN BLOOD BY AUTOMATED COUNT: 12 % (ref 11.6–15.1)
EST. AVERAGE GLUCOSE BLD GHB EST-MCNC: 111 MG/DL
GFR SERPL CREATININE-BSD FRML MDRD: 94 ML/MIN/1.73SQ M
GLUCOSE P FAST SERPL-MCNC: 90 MG/DL (ref 65–99)
HBA1C MFR BLD: 5.5 %
HCT VFR BLD AUTO: 41.8 % (ref 36.5–49.3)
HGB BLD-MCNC: 14 G/DL (ref 12–17)
IMM GRANULOCYTES # BLD AUTO: 0.02 THOUSAND/UL (ref 0–0.2)
IMM GRANULOCYTES NFR BLD AUTO: 0 % (ref 0–2)
LYMPHOCYTES # BLD AUTO: 2.1 THOUSANDS/ÂΜL (ref 0.6–4.47)
LYMPHOCYTES NFR BLD AUTO: 29 % (ref 14–44)
MCH RBC QN AUTO: 30.8 PG (ref 26.8–34.3)
MCHC RBC AUTO-ENTMCNC: 33.5 G/DL (ref 31.4–37.4)
MCV RBC AUTO: 92 FL (ref 82–98)
MONOCYTES # BLD AUTO: 0.66 THOUSAND/ÂΜL (ref 0.17–1.22)
MONOCYTES NFR BLD AUTO: 9 % (ref 4–12)
NEUTROPHILS # BLD AUTO: 4.34 THOUSANDS/ÂΜL (ref 1.85–7.62)
NEUTS SEG NFR BLD AUTO: 60 % (ref 43–75)
NRBC BLD AUTO-RTO: 0 /100 WBCS
PLATELET # BLD AUTO: 244 THOUSANDS/UL (ref 149–390)
PMV BLD AUTO: 9.8 FL (ref 8.9–12.7)
POTASSIUM SERPL-SCNC: 4.4 MMOL/L (ref 3.5–5.3)
PROT SERPL-MCNC: 6.8 G/DL (ref 6.4–8.4)
RBC # BLD AUTO: 4.54 MILLION/UL (ref 3.88–5.62)
SODIUM SERPL-SCNC: 138 MMOL/L (ref 135–147)
TSH SERPL DL<=0.05 MIU/L-ACNC: 0.87 UIU/ML (ref 0.45–4.5)
WBC # BLD AUTO: 7.22 THOUSAND/UL (ref 4.31–10.16)

## 2024-06-05 PROCEDURE — 85025 COMPLETE CBC W/AUTO DIFF WBC: CPT

## 2024-06-05 PROCEDURE — 83036 HEMOGLOBIN GLYCOSYLATED A1C: CPT

## 2024-06-05 PROCEDURE — 84443 ASSAY THYROID STIM HORMONE: CPT

## 2024-06-05 PROCEDURE — 99214 OFFICE O/P EST MOD 30 MIN: CPT | Performed by: FAMILY MEDICINE

## 2024-06-05 PROCEDURE — 80053 COMPREHEN METABOLIC PANEL: CPT

## 2024-06-05 PROCEDURE — 36415 COLL VENOUS BLD VENIPUNCTURE: CPT

## 2024-06-05 PROCEDURE — 73610 X-RAY EXAM OF ANKLE: CPT

## 2024-06-05 RX ORDER — NAPROXEN 500 MG/1
500 TABLET ORAL 2 TIMES DAILY WITH MEALS
Qty: 28 TABLET | Refills: 0 | Status: SHIPPED | OUTPATIENT
Start: 2024-06-05 | End: 2024-06-19

## 2024-06-05 NOTE — LETTER
June 5, 2024     Patient: Alfred Behr  YOB: 1990  Date of Visit: 6/5/2024      To Whom it May Concern:    Alfred Behr is under my professional care. Suresh was seen in my office on 6/5/2024. Suresh may return to work on 6/6/24 but limit to desk duty for the rest of the week due to ankle injury .    If you have any questions or concerns, please don't hesitate to call.         Sincerely,          David Mccray MD        CC: No Recipients

## 2024-06-05 NOTE — PROGRESS NOTES
Ambulatory Visit  Name: Alfred Behr      : 1990      MRN: [id number]  Encounter Provider: David Mccray MD  Encounter Date: 2024   Encounter department: [facility]    Assessment & Plan   1. Sprain and strain of right ankle  -     XR ankle 3+ vw right; Future; Expected date: 2024  -     naproxen (Naprosyn) 500 mg tablet; Take 1 tablet (500 mg total) by mouth 2 (two) times a day with meals for 14 days  2. Increased urinary frequency  -     Comprehensive metabolic panel; Future  -     CBC and differential; Future  -     Hemoglobin A1C; Future  3. Thinning hair  -     TSH, 3rd generation with Free T4 reflex; Future    Patient was recent right ankle sprain with bruising as well as tenderness over the lateral malleolus  Will obtain x-ray of the right ankle to rule out her ankle fracture  Start naproxen 500 mg twice a day with meals for the next 14 days  Will refer to sports medicine/orthopedic for evaluation         History of Present Illness     HPI    34-year-old male patient presents for evaluation regarding right ankle sprain.  Patient reports this incident occurred on 2024 while he was running at night while he stepped into a pothole and inverted his right ankle.  Patient was able to walk back home which was approximately 2 miles.  Since then he has been using a brace.  He is able to walk okay, no significant pain without weightbearing.  Patient does have pain when walking on uneven terrain.  There is bruising appreciated on the lateral aspect of the ankle as well as the heel.  Denies any significant numbness or tingling.  Patient has been using Motrin and ice.  Most pain is with stepping down a stair    Other concerns  Thinning hair,     Review of Systems   Constitutional:  Negative for chills and fever.   HENT:  Negative for congestion, rhinorrhea and sore throat.    Respiratory:  Negative for chest tightness and shortness of breath.    Cardiovascular:  Negative for chest  pain.   Gastrointestinal:  Negative for abdominal pain.   Musculoskeletal:  Positive for arthralgias and gait problem (on unstable terrain). Negative for back pain.   Neurological:  Negative for dizziness, light-headedness and headaches.   Psychiatric/Behavioral:  Negative for sleep disturbance.          Past Medical History:   Diagnosis Date    Colon polyp     CPAP (continuous positive airway pressure) dependence     Sleep apnea      Past Surgical History:   Procedure Laterality Date    COLONOSCOPY      HERNIA REPAIR      VASECTOMY       Family History   Problem Relation Age of Onset    Ulcerative colitis Mother     Colon cancer Father     Cancer Father         colon    Colon cancer Paternal Grandfather     Colon cancer Paternal Uncle      Social History     Tobacco Use    Smoking status: Never    Smokeless tobacco: Never   Vaping Use    Vaping status: Never Used   Substance and Sexual Activity    Alcohol use: Not Currently    Drug use: No    Sexual activity: Not on file     No current outpatient medications on file prior to visit.     No Known Allergies  Immunization History   Administered Date(s) Administered    Anthrax 06/01/2007, 12/09/2009    COVID-19 PFIZER VACCINE 0.3 ML IM 02/19/2021, 03/10/2021    H1N1 Inj 12/09/2009    Hep A / Hep B 04/24/2007    Hep A, adult 08/25/2006, 09/27/2006    Hep B, adult 08/25/2006, 09/27/2006, 04/24/2007    INFLUENZA 10/16/2016, 10/15/2017, 11/17/2019, 01/01/2021, 01/15/2021, 10/14/2023    Influenza LAIV (Nasal) 12/11/2006, 09/17/2011, 09/16/2012    Influenza Quadrivalent 3 years and older 10/19/2014, 11/15/2015, 12/06/2018, 03/19/2022    Influenza Quadrivalent Preservative Free 3 years and older IM 10/16/2016, 11/17/2019, 01/15/2021    Influenza Split 09/27/2006, 11/08/2007, 11/16/2008, 11/12/2010    Influenza, Quadrivalent (nasal) 10/19/2014    Influenza, seasonal, injectable, preservative free 01/11/2014    Japanese Encephalitis SC 02/13/2007, 04/24/2007    MMR 08/25/2006     "Meningococcal Polysaccharide (MPSV4) 08/25/2006    OPV 08/25/2006    Smallpox 04/01/2010    Td (adult), adsorbed 08/25/2006    Tdap 10/15/2016, 12/06/2017, 11/07/2019    Typhoid, Unspecified 09/27/2006, 12/09/2009    Yellow Fever 09/27/2006     Objective     /68 (BP Location: Right arm, Patient Position: Sitting, Cuff Size: Large)   Pulse 58   Temp 97.7 °F (36.5 °C)   Ht 5' 10\" (1.778 m)   Wt 82.2 kg (181 lb 3.2 oz)   SpO2 98%   BMI 26.00 kg/m²     Physical Exam  Vitals reviewed.   Constitutional:       General: He is not in acute distress.     Appearance: Normal appearance. He is not ill-appearing, toxic-appearing or diaphoretic.   Cardiovascular:      Rate and Rhythm: Normal rate.      Pulses: Normal pulses.   Pulmonary:      Effort: Pulmonary effort is normal.   Abdominal:      General: Abdomen is flat.   Musculoskeletal:         General: Swelling and tenderness present. No deformity. Normal range of motion.      Comments: Bruising noted on the lateral aspect of the right ankle as well as right heel.  There is mild tenderness over the lateral malleolus.  No tenderness over the medial malleolus less over the base of the fifth metatarsal  Noted with tenderness over the bone aspect of the right foot    Skin:     General: Skin is warm and dry.      Capillary Refill: Capillary refill takes less than 2 seconds.      Coloration: Skin is not jaundiced.   Neurological:      General: No focal deficit present.      Mental Status: He is alert and oriented to person, place, and time.   Psychiatric:         Mood and Affect: Mood normal.       David Mccray M.D.  Family Medicine    Please excuse any \"sound-alike\" errors that may have ocurred during the process of dictation. Parts of this note have been dictated and there may be errors present in the transcription process. Thank you.      "

## 2024-06-06 ENCOUNTER — OFFICE VISIT (OUTPATIENT)
Dept: OBGYN CLINIC | Facility: CLINIC | Age: 34
End: 2024-06-06
Payer: COMMERCIAL

## 2024-06-06 VITALS
HEART RATE: 48 BPM | HEIGHT: 70 IN | WEIGHT: 181.6 LBS | BODY MASS INDEX: 26 KG/M2 | SYSTOLIC BLOOD PRESSURE: 129 MMHG | DIASTOLIC BLOOD PRESSURE: 74 MMHG

## 2024-06-06 DIAGNOSIS — S93.491A SPRAIN OF ANTERIOR TALOFIBULAR LIGAMENT OF RIGHT ANKLE, INITIAL ENCOUNTER: Primary | ICD-10-CM

## 2024-06-06 DIAGNOSIS — S90.01XA CONTUSION OF RIGHT ANKLE, INITIAL ENCOUNTER: ICD-10-CM

## 2024-06-06 PROCEDURE — 99243 OFF/OP CNSLTJ NEW/EST LOW 30: CPT | Performed by: FAMILY MEDICINE

## 2024-06-06 NOTE — LETTER
June 6, 2024     David Mccray MD  [address] [facility]   [address] [place] [id number]    Patient: Alfred Behr   YOB: 1990   Date of Visit: 6/6/2024       Dear Dr. Mccray:    Thank you for referring Alfred Behr to me for evaluation. Below are my notes for this consultation.    If you have questions, please do not hesitate to call me. I look forward to following your patient along with you.         Sincerely,        Decbrit Princess Silva DO        CC: No Recipients    Prabhakar Silva DO  6/6/2024  4:19 PM  Sign when Signing Visit  Assessment/Plan:  Assessment & Plan  Diagnoses and all orders for this visit:    Sprain of anterior talofibular ligament of right ankle, initial encounter    Contusion of right ankle, initial encounter        34-year-old male  with onset of right ankle pain and swelling from injury while running on 6/2/2024.  Discussed with patient physical exam, radiographs, impression, and plan.  X-rays right ankle are unremarkable for acute osseous abnormality.  Physical exam right ankle noted for lateral soft tissue swelling and ecchymosis.  He has tenderness at the ATFL and lateral malleolus.  He has limited range of motion with dorsiflexion and eversion, however intact strength of the ankle.  Passive external rotation and Synnamon squeeze are unremarkable.  He has normal sensation and dorsalis pedis pulse.  He demonstrates heel walk toe walk, and squatting.  Clinical impression is that he sustained sprain and contusion to the ankle.  I discussed treatment regimen of continuing support, supplements, topical anti-inflammatory, and home exercise.  He is to take the prescribed naproxen 500 mg twice daily with food for 2 weeks.  He is to apply topical diclofenac gel 3 times a day for the next 10 days.  He is to do Epsom salt soaks at night.  He is to take turmeric, tart cherry, glucosamine, vitamin D, and calcium supplements.  I provided  printed instructions home exercises to do on regular basis.  I recommend he visit athletic shoe store to be advised on supportive footwear.  He will follow-up as needed.        Subjective:   Patient ID: Alfred Behr is a 34 y.o. male.  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  He stepped into a ditch and twisted his ankle.  He had pain described as sudden in onset, localized the lateral aspect ankle, none radiating, achy and sore and throbbing, worse with bearing weight and ambulating, and improved with resting.  He was able to continue bearing weight.  He is starting of immediate swelling.  He started having bruising.  He started self treating with icing and taking Tylenol and ibuprofen.  He was seen by primary care physician.  He was referred for x-rays which were unremarkable and advised to follow-up with orthopedic care.  Has been wearing an over-the-counter ankle brace.  He states he has been able to continue being active however he has decreased his level of physical activity.    Ankle Pain  This is a new problem. The current episode started in the past 7 days. The problem occurs daily. The problem has been gradually improving. Associated symptoms include arthralgias and joint swelling. Pertinent negatives include no numbness or weakness. The symptoms are aggravated by twisting. He has tried rest, position changes, NSAIDs and ice for the symptoms. The treatment provided mild relief.           The following portions of the patient's history were reviewed and updated as appropriate: He  has a past medical history of Colon polyp, CPAP (continuous positive airway pressure) dependence, and Sleep apnea.  He has No Known Allergies..    Review of Systems   Musculoskeletal:  Positive for arthralgias and joint swelling.   Neurological:  " Negative for weakness and numbness.       Objective:  Vitals:    06/06/24 1320   BP: 129/74   Pulse: (!) 48   Weight: 82.4 kg (181 lb 9.6 oz)   Height: 5' 10\" (1.778 m)      Right Ankle Exam     Range of Motion   Dorsiflexion:  10   Plantar flexion:  normal   Eversion:  5   Inversion:  normal     Muscle Strength   Dorsiflexion:  5/5  Plantar flexion:  5/5    Other   Sensation: normal  Pulse: present     Comments:  Lateral soft tissue swelling and ecchymosis          Observations     Right Ankle/Foot   Negative for deformity.     Tenderness     Right Ankle/Foot   Tenderness in the anterior talofibular ligament and lateral malleolus. No tenderness in the Achilles insertion, anterior ankle, fifth metatarsal base, calcaneofibular ligament, deltoid ligament, dorsum foot, medial malleolus, navicular, peroneal tendon, posterior tibial tendon, posterior talofibular ligament, proximal Achilles and talar dome.     Strength/Myotome Testing     Right Ankle/Foot   Dorsiflexion: 5  Plantar flexion: 5  Inversion: 5  Eversion: 5    Tests     Right Ankle/Foot   Negative for anterior drawer, calcaneal squeeze, metatarsal squeeze, posterior drawer, syndesmosis squeeze and syndesmosis external rotation.       Physical Exam  Vitals and nursing note reviewed.   Constitutional:       Appearance: Normal appearance. He is well-developed. He is not ill-appearing or diaphoretic.   HENT:      Head: Normocephalic and atraumatic.      Right Ear: External ear normal.      Left Ear: External ear normal.   Eyes:      Conjunctiva/sclera: Conjunctivae normal.   Neck:      Trachea: No tracheal deviation.   Cardiovascular:      Comments: Bradycardic  Pulmonary:      Effort: Pulmonary effort is normal. No respiratory distress.   Abdominal:      General: There is no distension.   Musculoskeletal:         General: Swelling, tenderness and signs of injury present.      Right ankle: Tenderness present over the lateral malleolus and ATF ligament. No medial " malleolus, CF ligament, posterior TF ligament or base of 5th metatarsal tenderness. Anterior drawer test negative.      Right foot: No deformity.   Skin:     General: Skin is warm and dry.      Coloration: Skin is not jaundiced or pale.   Neurological:      Mental Status: He is alert and oriented to person, place, and time.   Psychiatric:         Mood and Affect: Mood normal.         Behavior: Behavior normal.         Thought Content: Thought content normal.         Judgment: Judgment normal.         I have personally reviewed pertinent films in PACS and my interpretation is  .  No acute osseous abnormality the right ankle.

## 2024-06-06 NOTE — PROGRESS NOTES
Assessment/Plan:  Assessment & Plan   Diagnoses and all orders for this visit:    Sprain of anterior talofibular ligament of right ankle, initial encounter    Contusion of right ankle, initial encounter        34-year-old male  with onset of right ankle pain and swelling from injury while running on 6/2/2024.  Discussed with patient physical exam, radiographs, impression, and plan.  X-rays right ankle are unremarkable for acute osseous abnormality.  Physical exam right ankle noted for lateral soft tissue swelling and ecchymosis.  He has tenderness at the ATFL and lateral malleolus.  He has limited range of motion with dorsiflexion and eversion, however intact strength of the ankle.  Passive external rotation and Synnamon squeeze are unremarkable.  He has normal sensation and dorsalis pedis pulse.  He demonstrates heel walk toe walk, and squatting.  Clinical impression is that he sustained sprain and contusion to the ankle.  I discussed treatment regimen of continuing support, supplements, topical anti-inflammatory, and home exercise.  He is to take the prescribed naproxen 500 mg twice daily with food for 2 weeks.  He is to apply topical diclofenac gel 3 times a day for the next 10 days.  He is to do Epsom salt soaks at night.  He is to take turmeric, tart cherry, glucosamine, vitamin D, and calcium supplements.  I provided printed instructions home exercises to do on regular basis.  I recommend he visit athletic shoe store to be advised on supportive footwear.  He will follow-up as needed.        Subjective:   Patient ID: Alfred Behr is a 34 y.o. male.  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  He stepped into a ditch and twisted his ankle.  He had pain described as sudden in onset, localized the lateral aspect ankle,  "none radiating, achy and sore and throbbing, worse with bearing weight and ambulating, and improved with resting.  He was able to continue bearing weight.  He is starting of immediate swelling.  He started having bruising.  He started self treating with icing and taking Tylenol and ibuprofen.  He was seen by primary care physician.  He was referred for x-rays which were unremarkable and advised to follow-up with orthopedic care.  Has been wearing an over-the-counter ankle brace.  He states he has been able to continue being active however he has decreased his level of physical activity.    Ankle Pain  This is a new problem. The current episode started in the past 7 days. The problem occurs daily. The problem has been gradually improving. Associated symptoms include arthralgias and joint swelling. Pertinent negatives include no numbness or weakness. The symptoms are aggravated by twisting. He has tried rest, position changes, NSAIDs and ice for the symptoms. The treatment provided mild relief.           The following portions of the patient's history were reviewed and updated as appropriate: He  has a past medical history of Colon polyp, CPAP (continuous positive airway pressure) dependence, and Sleep apnea.  He has No Known Allergies..    Review of Systems   Musculoskeletal:  Positive for arthralgias and joint swelling.   Neurological:  Negative for weakness and numbness.       Objective:  Vitals:    06/06/24 1320   BP: 129/74   Pulse: (!) 48   Weight: 82.4 kg (181 lb 9.6 oz)   Height: 5' 10\" (1.778 m)      Right Ankle Exam     Range of Motion   Dorsiflexion:  10   Plantar flexion:  normal   Eversion:  5   Inversion:  normal     Muscle Strength   Dorsiflexion:  5/5  Plantar flexion:  5/5    Other   Sensation: normal  Pulse: present     Comments:  Lateral soft tissue swelling and ecchymosis          Observations     Right Ankle/Foot   Negative for deformity.     Tenderness     Right Ankle/Foot   Tenderness in the " anterior talofibular ligament and lateral malleolus. No tenderness in the Achilles insertion, anterior ankle, fifth metatarsal base, calcaneofibular ligament, deltoid ligament, dorsum foot, medial malleolus, navicular, peroneal tendon, posterior tibial tendon, posterior talofibular ligament, proximal Achilles and talar dome.     Strength/Myotome Testing     Right Ankle/Foot   Dorsiflexion: 5  Plantar flexion: 5  Inversion: 5  Eversion: 5    Tests     Right Ankle/Foot   Negative for anterior drawer, calcaneal squeeze, metatarsal squeeze, posterior drawer, syndesmosis squeeze and syndesmosis external rotation.       Physical Exam  Vitals and nursing note reviewed.   Constitutional:       Appearance: Normal appearance. He is well-developed. He is not ill-appearing or diaphoretic.   HENT:      Head: Normocephalic and atraumatic.      Right Ear: External ear normal.      Left Ear: External ear normal.   Eyes:      Conjunctiva/sclera: Conjunctivae normal.   Neck:      Trachea: No tracheal deviation.   Cardiovascular:      Comments: Bradycardic  Pulmonary:      Effort: Pulmonary effort is normal. No respiratory distress.   Abdominal:      General: There is no distension.   Musculoskeletal:         General: Swelling, tenderness and signs of injury present.      Right ankle: Tenderness present over the lateral malleolus and ATF ligament. No medial malleolus, CF ligament, posterior TF ligament or base of 5th metatarsal tenderness. Anterior drawer test negative.      Right foot: No deformity.   Skin:     General: Skin is warm and dry.      Coloration: Skin is not jaundiced or pale.   Neurological:      Mental Status: He is alert and oriented to person, place, and time.   Psychiatric:         Mood and Affect: Mood normal.         Behavior: Behavior normal.         Thought Content: Thought content normal.         Judgment: Judgment normal.         I have personally reviewed pertinent films in PACS and my interpretation is  .  No  acute osseous abnormality the right ankle.

## 2024-06-06 NOTE — PATIENT INSTRUCTIONS
Rx: naproxen 500 mg  - twice daily  - eat first  - 2 weeks    Over the counter diclofenac gel/voltaren  - 3 times daily 10 days    Over the counter vitamins:    - Turmeric vitamin at least 1000 mg daily    - Tart cherry vitamin at least 1000 mg daily    - Glucosamine-chondrointin 2-3 times daily    - Vitamin D 2000 IU    - Calcium 500 mg    [employer] [facility] in [place]    Ankle Exercises   AMBULATORY CARE:   What you need to know about ankle exercises:  Ankle exercises help strengthen your ankle and improve its function after injury. These are beginning exercises. Ask your healthcare provider if you need to see a physical therapist for more advanced exercises.   General guidelines for ankle exercises:   Do these exercises 3 to 5 days a week, or as directed by your healthcare provider.  Ask if you should do the exercises on each ankle.    Do the exercises in the order that your healthcare provider recommends.  This will help prevent swelling, chronic pain, and reinjury. Start with range of motion exercises. Then move to strengthening exercises, and finally to balancing exercises.    Warm up before you do ankle exercises.  Walk or ride a stationary bike for 5 to 10 minutes to prepare your ankle for movement.    Stop if you feel pain.  It is normal to feel some discomfort at first but you should not feel pain. Tell your doctor or physical therapist if you have pain while you exercise. Regular exercise will help decrease your discomfort over time.    How to perform range of motion exercises safely:  Begin with range of motion exercises to improve flexibility. Ask your healthcare provider when you can progress to strengthening exercises.  Ankle alphabet:  Sit on a chair so that your feet do not touch the floor. Use your big toe to write each letter of the alphabet. Use only your foot and ankle, and keep your movements small. Do 2 sets.         Calf stretches:      Sitting calf stretches with a towel:  Sit on  the floor with both legs out straight in front of you. Loop a towel around the ball of your injured foot. Grasp the ends of the towel and pull it toward you. Keep your leg and back straight. Do not lean forward as you pull the towel. Hold for 30 seconds. Then relax for 30 seconds. Do 2 sets of 10.         Standing calf stretches:  Stand facing a wall with the foot that is not injured forward and your knee slightly bent. Keep the leg with the injured foot straight and behind you with your toes pointed in slightly. With both heels flat on the floor, press your hips forward. Do not arch your back. Hold for 30 seconds, and then relax for 30 seconds. Do 2 sets of 10. Repeat with your leg bent. Do 2 sets of 10.       How to perform strengthening exercises safely:  After you can perform range of motion exercises without pain, you may begin strengthening exercises. Ask your healthcare provider when you can progress to balancing exercises.  Ankle movement in 4 directions:  Sit on the floor with your legs straight in front of you. Keep your heels on the floor for support.    Dorsiflexion:  Begin with your toes pointing straight up. Pull your toes toward your body. Slowly return to the starting position. Do 3 sets of 5.     Plantar flexion:  Begin with your toes pointing straight up. Push your toes away from your body. Slowly return to the starting position. Do 3 sets of 5.         Inversion:  Begin with your toes pointing straight up. Push your toes inward, toward each other. Slowly return to the starting position. Do 3 sets of 5.     Eversion:  Begin with your toes pointing straight up. Push your toes outward, away from each other. Slowly return to the starting position. Do 3 sets of 5.       Toe curls with a towel:  Sit on a chair so that both of your feet are flat on the floor. Place a small towel on the floor in front of your injured foot. Grab the center of the towel with your toes and curl the towel toward you. Relax  and repeat. Do 1 set of 5.         [place] pick-ups:  Sit on a chair so that both of your feet are flat on the floor. Place 20 marbles on the floor in front of your injured foot. Use your toes to  one marble at a time and place it into a bowl. Repeat until you have picked up all the marbles. Do 1 set.     Heel raises:      Single leg heel raises:  Stand with your weight evenly on both feet. Hold on to a chair or a wall for balance. Lift the foot that is not injured off the floor so all your weight is placed on your injured foot. Raise the heel of your injured foot as high as you can. Slowly lower your heel to the floor. Do 1 set of 10.         Double leg heel raises:  Stand with your weight evenly on both feet. Hold on to a chair or a wall for balance. Raise both of your heels as high as you can. Slowly lower your heels to the floor. Do 1 set of 10.       Heel and toe walks:      Heel walks:  Begin in a standing position. Lift your toes off the floor and walk on your heels. Keep your toes lifted as high as possible. Do 2 sets of 10.         Toe walks:  Begin in a standing position. Lift your heels off the floor and walk on the balls and toes of your feet. Keep your heels lifted as high as possible. Do 2 sets of 10.       How to perform a balance exercise safely:  After you can perform strengthening exercises without pain, you may do this beginning balancing exercise. Ask your healthcare provider for more advanced balance exercises.  Single leg stance:  Stand with your weight evenly on both feet, or hold on to a chair or a wall. Do not lean to the side. Lift the foot that is not injured off the floor so all your weight is placed on your injured foot. Balance on your injured foot. Ask your healthcare provider how long to hold this position.           Call your doctor or physical therapist if:   You have new pain, or your pain becomes worse.    You have questions or concerns about your condition, care, or  exercise program.    © Copyright [employer] 2023 Information is for End User's use only and may not be sold, redistributed or otherwise used for commercial purposes.  The above information is an  only. It is not intended as medical advice for individual conditions or treatments. Talk to your doctor, nurse or pharmacist before following any medical regimen to see if it is safe and effective for you.

## 2024-06-14 ENCOUNTER — RA CDI HCC (OUTPATIENT)
Dept: OTHER | Facility: HOSPITAL | Age: 34
End: 2024-06-14

## 2024-07-18 ENCOUNTER — OFFICE VISIT (OUTPATIENT)
Dept: FAMILY MEDICINE CLINIC | Facility: CLINIC | Age: 34
End: 2024-07-18
Payer: COMMERCIAL

## 2024-07-18 VITALS
BODY MASS INDEX: 25.44 KG/M2 | HEART RATE: 57 BPM | HEIGHT: 70 IN | WEIGHT: 177.69 LBS | OXYGEN SATURATION: 98 % | DIASTOLIC BLOOD PRESSURE: 74 MMHG | TEMPERATURE: 98 F | SYSTOLIC BLOOD PRESSURE: 114 MMHG

## 2024-07-18 DIAGNOSIS — L65.9 HAIR LOSS: ICD-10-CM

## 2024-07-18 DIAGNOSIS — Z00.00 ANNUAL PHYSICAL EXAM: Primary | ICD-10-CM

## 2024-07-18 DIAGNOSIS — G47.33 OBSTRUCTIVE SLEEP APNEA SYNDROME: ICD-10-CM

## 2024-07-18 PROCEDURE — 99213 OFFICE O/P EST LOW 20 MIN: CPT | Performed by: FAMILY MEDICINE

## 2024-07-18 PROCEDURE — 99395 PREV VISIT EST AGE 18-39: CPT | Performed by: FAMILY MEDICINE

## 2024-07-18 NOTE — PATIENT INSTRUCTIONS
"Prazosin aka minipress    Patient Education     Routine physical for adults   The Basics   Written by the doctors and editors at [facility]   What is a physical? -- A physical is a routine visit, or \"check-up,\" with your doctor. You might also hear it called a \"wellness visit\" or \"preventive visit.\"  During each visit, the doctor will:   Ask about your physical and mental health   Ask about your habits, behaviors, and lifestyle   Do an exam   Give you vaccines if needed   Talk to you about any medicines you take   Give advice about your health   Answer your questions  Getting regular check-ups is an important part of taking care of your health. It can help your doctor find and treat any problems you have. But it's also important for preventing health problems.  A routine physical is different from a \"sick visit.\" A sick visit is when you see a doctor because of a health concern or problem. Since physicals are scheduled ahead of time, you can think about what you want to ask the doctor.  How often should I get a physical? -- It depends on your age and health. In general, for people age 21 years and older:   If you are younger than 50 years, you might be able to get a physical every 3 years.   If you are 50 years or older, your doctor might recommend a physical every year.  If you have an ongoing health condition, like diabetes or high blood pressure, your doctor will probably want to see you more often.  What happens during a physical? -- In general, each visit will include:   Physical exam - The doctor or nurse will check your height, weight, heart rate, and blood pressure. They will also look at your eyes and ears. They will ask about how you are feeling and whether you have any symptoms that bother you.   Medicines - It's a good idea to bring a list of all the medicines you take to each doctor visit. Your doctor will talk to you about your medicines and answer any questions. Tell them if you are having any side " "effects that bother you. You should also tell them if you are having trouble paying for any of your medicines.   Habits and behaviors - This includes:   Your diet   Your exercise habits   Whether you smoke, drink alcohol, or use drugs   Whether you are sexually active   Whether you feel safe at home  Your doctor will talk to you about things you can do to improve your health and lower your risk of health problems. They will also offer help and support. For example, if you want to quit smoking, they can give you advice and might prescribe medicines. If you want to improve your diet or get more physical activity, they can help you with this, too.   Lab tests, if needed - The tests you get will depend on your age and situation. For example, your doctor might want to check your:   Cholesterol   Blood sugar   Iron level   Vaccines - The recommended vaccines will depend on your age, health, and what vaccines you already had. Vaccines are very important because they can prevent certain serious or deadly infections.   Discussion of screening - \"Screening\" means checking for diseases or other health problems before they cause symptoms. Your doctor can recommend screening based on your age, risk, and preferences. This might include tests to check for:   Cancer, such as breast, prostate, cervical, ovarian, colorectal, prostate, lung, or skin cancer   Sexually transmitted infections, such as chlamydia and gonorrhea   Mental health conditions like depression and anxiety  Your doctor will talk to you about the different types of screening tests. They can help you decide which screenings to have. They can also explain what the results might mean.   Answering questions - The physical is a good time to ask the doctor or nurse questions about your health. If needed, they can refer you to other doctors or specialists, too.  Adults older than 65 years often need other care, too. As you get older, your doctor will talk to you about:   " How to prevent falling at home   Hearing or vision tests   Memory testing   How to take your medicines safely   Making sure that you have the help and support you need at home  All topics are updated as new evidence becomes available and our peer review process is complete.  This topic retrieved from [employer] on: May 02, 2024.  Topic 410733 Version 1.0  Release: 32.4.3 - C32.122  © 2024 UpToDate, Inc. and/or its affiliates. All rights reserved.  Consumer Information Use and Disclaimer   Disclaimer: This generalized information is a limited summary of diagnosis, treatment, and/or medication information. It is not meant to be comprehensive and should be used as a tool to help the user understand and/or assess potential diagnostic and treatment options. It does NOT include all information about conditions, treatments, medications, side effects, or risks that may apply to a specific patient. It is not intended to be medical advice or a substitute for the medical advice, diagnosis, or treatment of a health care provider based on the health care provider's examination and assessment of a patient's specific and unique circumstances. Patients must speak with a health care provider for complete information about their health, medical questions, and treatment options, including any risks or benefits regarding use of medications. This information does not endorse any treatments or medications as safe, effective, or approved for treating a specific patient. UpToDate, Inc. and its affiliates disclaim any warranty or liability relating to this information or the use thereof.The use of this information is governed by the Terms of Use, available at https://www.wolterskluwer.com/en/know/clinical-effectiveness-terms. 2024© UpToDate, Inc. and its affiliates and/or licensors. All rights reserved.  Copyright   © 2024 UpToDate, Inc. and/or its affiliates. All rights reserved.    Patient Education     Routine physical for adults   The  "Basics   Written by the doctors and editors at [facility]   What is a physical? -- A physical is a routine visit, or \"check-up,\" with your doctor. You might also hear it called a \"wellness visit\" or \"preventive visit.\"  During each visit, the doctor will:   Ask about your physical and mental health   Ask about your habits, behaviors, and lifestyle   Do an exam   Give you vaccines if needed   Talk to you about any medicines you take   Give advice about your health   Answer your questions  Getting regular check-ups is an important part of taking care of your health. It can help your doctor find and treat any problems you have. But it's also important for preventing health problems.  A routine physical is different from a \"sick visit.\" A sick visit is when you see a doctor because of a health concern or problem. Since physicals are scheduled ahead of time, you can think about what you want to ask the doctor.  How often should I get a physical? -- It depends on your age and health. In general, for people age 21 years and older:   If you are younger than 50 years, you might be able to get a physical every 3 years.   If you are 50 years or older, your doctor might recommend a physical every year.  If you have an ongoing health condition, like diabetes or high blood pressure, your doctor will probably want to see you more often.  What happens during a physical? -- In general, each visit will include:   Physical exam - The doctor or nurse will check your height, weight, heart rate, and blood pressure. They will also look at your eyes and ears. They will ask about how you are feeling and whether you have any symptoms that bother you.   Medicines - It's a good idea to bring a list of all the medicines you take to each doctor visit. Your doctor will talk to you about your medicines and answer any questions. Tell them if you are having any side effects that bother you. You should also tell them if you are having trouble paying " "for any of your medicines.   Habits and behaviors - This includes:   Your diet   Your exercise habits   Whether you smoke, drink alcohol, or use drugs   Whether you are sexually active   Whether you feel safe at home  Your doctor will talk to you about things you can do to improve your health and lower your risk of health problems. They will also offer help and support. For example, if you want to quit smoking, they can give you advice and might prescribe medicines. If you want to improve your diet or get more physical activity, they can help you with this, too.   Lab tests, if needed - The tests you get will depend on your age and situation. For example, your doctor might want to check your:   Cholesterol   Blood sugar   Iron level   Vaccines - The recommended vaccines will depend on your age, health, and what vaccines you already had. Vaccines are very important because they can prevent certain serious or deadly infections.   Discussion of screening - \"Screening\" means checking for diseases or other health problems before they cause symptoms. Your doctor can recommend screening based on your age, risk, and preferences. This might include tests to check for:   Cancer, such as breast, prostate, cervical, ovarian, colorectal, prostate, lung, or skin cancer   Sexually transmitted infections, such as chlamydia and gonorrhea   Mental health conditions like depression and anxiety  Your doctor will talk to you about the different types of screening tests. They can help you decide which screenings to have. They can also explain what the results might mean.   Answering questions - The physical is a good time to ask the doctor or nurse questions about your health. If needed, they can refer you to other doctors or specialists, too.  Adults older than 65 years often need other care, too. As you get older, your doctor will talk to you about:   How to prevent falling at home   Hearing or vision tests   Memory testing   How to " take your medicines safely   Making sure that you have the help and support you need at home  All topics are updated as new evidence becomes available and our peer review process is complete.  This topic retrieved from [employer] on: May 02, 2024.  Topic 329141 Version 1.0  Release: 32.4.3 - C32.122  © 2024 UpToDate, Inc. and/or its affiliates. All rights reserved.  Consumer Information Use and Disclaimer   Disclaimer: This generalized information is a limited summary of diagnosis, treatment, and/or medication information. It is not meant to be comprehensive and should be used as a tool to help the user understand and/or assess potential diagnostic and treatment options. It does NOT include all information about conditions, treatments, medications, side effects, or risks that may apply to a specific patient. It is not intended to be medical advice or a substitute for the medical advice, diagnosis, or treatment of a health care provider based on the health care provider's examination and assessment of a patient's specific and unique circumstances. Patients must speak with a health care provider for complete information about their health, medical questions, and treatment options, including any risks or benefits regarding use of medications. This information does not endorse any treatments or medications as safe, effective, or approved for treating a specific patient. UpToDate, Inc. and its affiliates disclaim any warranty or liability relating to this information or the use thereof.The use of this information is governed by the Terms of Use, available at https://www.woltersVivebiouwer.com/en/know/clinical-effectiveness-terms. 2024© UpToDate, Inc. and its affiliates and/or licensors. All rights reserved.  Copyright   © 2024 UpToDate, Inc. and/or its affiliates. All rights reserved.

## 2024-07-18 NOTE — PROGRESS NOTES
"Adult Annual Physical  Name: Alfred Behr      : 1990      MRN: [id number]  Encounter Provider: David Mccray MD  Encounter Date: 2024   Encounter department: [facility]    Assessment & Plan   1. Annual physical exam  2. Hair loss  -     minoxidil (ROGAINE) 2 % external solution; Apply topically 2 (two) times a day  3. Obstructive sleep apnea syndrome  -     Ambulatory Referral to Sleep Medicine; Future    Consider minipress for night time symptoms    Immunizations and preventive care screenings were discussed with patient today. Appropriate education was printed on patient's after visit summary.    Counseling:  Alcohol/drug use: discussed moderation in alcohol intake, the recommendations for healthy alcohol use, and avoidance of illicit drug use.  Dental Health: discussed importance of regular tooth brushing, flossing, and dental visits.  Injury prevention: discussed safety/seat belts, safety helmets, smoke detectors, carbon dioxide detectors, and smoking near bedding or upholstery.  Sexual health: discussed sexually transmitted diseases, partner selection, use of condoms, avoidance of unintended pregnancy, and contraceptive alternatives.  Exercise: the importance of regular exercise/physical activity was discussed. Recommend exercise 3-5 times per week for at least 30 minutes.          History of Present Illness     Adult Annual Physical:  Patient presents for annual physical. Reviewed most recent blood work  Patient with improvement in right ankle strain, continues to have occasional pain and swelling however is improving  Patient has cut down on caffeine intake, this has improved urinary frequency  Substituting with \"element\"  Patient continues noticed some hair thinning, agreeable with trial Rogaine.     Diet and Physical Activity:  - Diet/Nutrition: well balanced diet and consuming 3-5 servings of fruits/vegetables daily.  - Exercise: 5-7 times a week on average. " "running    Depression Screening:  - PHQ-2 Score: 0    General Health:  - Sleep: sleeps poorly. using a cpap, removes it unintentionally  - Hearing: normal hearing bilateral ears.  - Vision: no vision problems.  - Dental: brushes teeth twice daily and regular dental visits.     Health:  - History of STDs: no.   - Urinary symptoms: none.     Advanced Care Planning:  - Has an advanced directive?: no    - Has a durable medical POA?: no    - ACP document given to patient?: no      Review of Systems   Constitutional:  Negative for chills and fever.   HENT:  Negative for congestion, rhinorrhea and sore throat.    Respiratory:  Negative for chest tightness and shortness of breath.    Cardiovascular:  Negative for chest pain.   Gastrointestinal:  Negative for abdominal pain.   Musculoskeletal:  Positive for arthralgias (mild right ankle pain) and back pain (\"tweaked my back yesterday\").   Neurological:  Negative for dizziness, light-headedness and headaches.   Psychiatric/Behavioral:  Positive for sleep disturbance.        Objective     /74 (BP Location: Right arm, Patient Position: Sitting, Cuff Size: Standard)   Pulse 57   Temp 98 °F (36.7 °C) (Temporal)   Ht 5' 10\" (1.778 m)   Wt 80.6 kg (177 lb 11.1 oz)   SpO2 98%   BMI 25.50 kg/m²     Physical Exam  Vitals reviewed.   Constitutional:       General: He is not in acute distress.     Appearance: Normal appearance. He is not ill-appearing, toxic-appearing or diaphoretic.   Cardiovascular:      Rate and Rhythm: Normal rate.      Pulses: Normal pulses.   Pulmonary:      Effort: Pulmonary effort is normal.   Abdominal:      General: Abdomen is flat.   Musculoskeletal:         General: No swelling or deformity.      Comments: Tightness over the paralumbar spinal muscle on the right   Skin:     General: Skin is warm and dry.      Capillary Refill: Capillary refill takes less than 2 seconds.      Coloration: Skin is not jaundiced.   Neurological:      General: No " "focal deficit present.      Mental Status: He is alert.   Psychiatric:         Mood and Affect: Mood normal.           David Mccray M.D.  Family Medicine    Please excuse any \"sound-alike\" errors that may have ocurred during the process of dictation. Parts of this note have been dictated and there may be errors present in the transcription process. Thank you.    "

## 2025-04-04 ENCOUNTER — OFFICE VISIT (OUTPATIENT)
Dept: FAMILY MEDICINE CLINIC | Facility: CLINIC | Age: 35
End: 2025-04-04
Payer: COMMERCIAL

## 2025-04-04 VITALS
OXYGEN SATURATION: 98 % | HEIGHT: 70 IN | TEMPERATURE: 98.4 F | RESPIRATION RATE: 16 BRPM | SYSTOLIC BLOOD PRESSURE: 114 MMHG | WEIGHT: 189 LBS | HEART RATE: 56 BPM | BODY MASS INDEX: 27.06 KG/M2 | DIASTOLIC BLOOD PRESSURE: 62 MMHG

## 2025-04-04 DIAGNOSIS — D22.9 FLESHY NEVUS OF SKIN: Primary | ICD-10-CM

## 2025-04-04 PROCEDURE — 99213 OFFICE O/P EST LOW 20 MIN: CPT | Performed by: FAMILY MEDICINE

## 2025-04-04 NOTE — PROGRESS NOTES
Name: Alfred Behr      : 1990      MRN: [id number]  Encounter Provider: David Mccray MD  Encounter Date: 2025   Encounter department: [facility]    Assessment & Plan  Fleshy nevus of skin  Skin lesion likely secondary to a flesh-colored nevi, due to the location and is causing some discomfort and irritation, will refer to dermatology for evaluation and possible excision  Orders:    Ambulatory Referral to Dermatology; Future         History of Present Illness       ABBY Woo is a 34-year-old male patient presents with a lump underneath his left earlobe.  Patient reports this has been present for 3 to 4 weeks ago.  He has tried massaging the area which caused some irritation.  No discharge or bleeding.  Patient thought this could have been a ingrown hair and did try to poke it with a pin  The lump is soft, approximately half a centimeter in diameter, fleshy colored, no significant erythema.      Review of Systems   Constitutional:  Negative for chills and fever.   HENT:  Negative for congestion, rhinorrhea and sore throat.    Respiratory:  Negative for chest tightness and shortness of breath.    Cardiovascular:  Negative for chest pain.   Gastrointestinal:  Negative for abdominal pain.   Neurological:  Negative for dizziness, light-headedness and headaches.       Past Medical History:   Diagnosis Date    Colon polyp     CPAP (continuous positive airway pressure) dependence     Sleep apnea      Past Surgical History:   Procedure Laterality Date    COLONOSCOPY      HERNIA REPAIR      VASECTOMY       Family History   Problem Relation Age of Onset    Ulcerative colitis Mother     Colon cancer Father     Cancer Father         colon    Colon cancer Paternal Grandfather     Colon cancer Paternal Uncle      Social History     Tobacco Use    Smoking status: Never    Smokeless tobacco: Never   Vaping Use    Vaping status: Never Used   Substance and Sexual Activity    Alcohol use: Not  "Currently    Drug use: No    Sexual activity: Not on file     Current Outpatient Medications on File Prior to Visit   Medication Sig    minoxidil (ROGAINE) 2 % external solution Apply topically 2 (two) times a day (Patient not taking: Reported on 4/4/2025)    naproxen (Naprosyn) 500 mg tablet Take 1 tablet (500 mg total) by mouth 2 (two) times a day with meals for 14 days     No Known Allergies  Immunization History   Administered Date(s) Administered    Anthrax 06/01/2007, 12/09/2009    COVID-19 PFIZER VACCINE 0.3 ML IM 02/19/2021, 03/10/2021    H1N1 Inj 12/09/2009    Hep A / Hep B 04/24/2007    Hep A, adult 08/25/2006, 09/27/2006    Hep B, adult 08/25/2006, 09/27/2006, 04/24/2007    INFLUENZA 10/16/2016, 10/15/2017, 11/17/2019, 01/01/2021, 01/15/2021, 10/14/2023    Influenza Injectable, MDCK, Preservative Free, 0.5 mL 08/19/2024    Influenza LAIV (Nasal) 12/11/2006, 09/17/2011, 09/16/2012    Influenza Quadrivalent 3 years and older 10/19/2014, 11/15/2015, 12/06/2018, 03/19/2022    Influenza Quadrivalent Preservative Free 3 years and older IM 10/16/2016, 11/17/2019, 01/15/2021    Influenza Split 09/27/2006, 11/08/2007, 11/16/2008, 12/09/2009, 11/12/2010    Influenza, Quadrivalent (nasal) 10/19/2014    Influenza, seasonal, injectable, preservative free 01/11/2014    Japanese Encephalitis SC 02/13/2007, 04/24/2007    MMR 08/25/2006    Meningococcal Polysaccharide (MPSV4) 08/25/2006    OPV 08/25/2006    Smallpox 04/01/2010    Td (adult), adsorbed 08/25/2006    Tdap 10/15/2016, 12/06/2017, 11/07/2019    Typhoid, Unspecified 09/27/2006, 12/09/2009    Yellow Fever 09/27/2006     Objective   /62 (BP Location: Right arm, Patient Position: Sitting, Cuff Size: Large)   Pulse 56   Temp 98.4 °F (36.9 °C) (Temporal)   Resp 16   Ht 5' 10\" (1.778 m)   Wt 85.7 kg (189 lb)   SpO2 98%   BMI 27.12 kg/m²     Physical Exam  Vitals reviewed.   Constitutional:       General: He is not in acute distress.     Appearance: " "Normal appearance. He is not ill-appearing, toxic-appearing or diaphoretic.   Cardiovascular:      Rate and Rhythm: Normal rate.      Pulses: Normal pulses.   Pulmonary:      Effort: Pulmonary effort is normal.   Abdominal:      General: Abdomen is flat.   Musculoskeletal:         General: No swelling or deformity.   Skin:     General: Skin is warm and dry.      Capillary Refill: Capillary refill takes less than 2 seconds.      Coloration: Skin is not jaundiced.      Comments: Flesh colored cystic lesion, soft, non tender   Neurological:      General: No focal deficit present.      Mental Status: He is alert.   Psychiatric:         Mood and Affect: Mood normal.         David Mccray M.D.  Family Medicine    Please excuse any \"sound-alike\" errors that may have ocurred during the process of dictation. Parts of this note have been dictated and there may be errors present in the transcription process. Thank you.    "

## 2025-06-27 NOTE — MISCELLANEOUS
Message  Return to work or school:   Gaby Steiner is under my professional care  He was seen in my office on 12/06/2017       Please excuse illness  Orin Aldana PA-C        Signatures   Electronically signed by : Lucia Mendez, [facility]; Dec  6 2017  2:52PM EST                       (Author)
Yes